# Patient Record
Sex: MALE | Race: OTHER | HISPANIC OR LATINO | URBAN - METROPOLITAN AREA
[De-identification: names, ages, dates, MRNs, and addresses within clinical notes are randomized per-mention and may not be internally consistent; named-entity substitution may affect disease eponyms.]

---

## 2019-02-06 ENCOUNTER — EMERGENCY (EMERGENCY)
Facility: HOSPITAL | Age: 29
LOS: 1 days | Discharge: HOME | End: 2019-02-06
Attending: EMERGENCY MEDICINE
Payer: SELF-PAY

## 2019-02-06 VITALS
HEIGHT: 71 IN | TEMPERATURE: 98 F | OXYGEN SATURATION: 96 % | SYSTOLIC BLOOD PRESSURE: 160 MMHG | DIASTOLIC BLOOD PRESSURE: 89 MMHG | HEART RATE: 85 BPM | WEIGHT: 240.08 LBS | RESPIRATION RATE: 16 BRPM

## 2019-02-06 LAB
ANION GAP SERPL CALC-SCNC: 13 MMOL/L — SIGNIFICANT CHANGE UP (ref 5–17)
APPEARANCE UR: CLEAR — SIGNIFICANT CHANGE UP
BACTERIA # UR AUTO: ABNORMAL
BILIRUB UR-MCNC: NEGATIVE — SIGNIFICANT CHANGE UP
BUN SERPL-MCNC: 13 MG/DL — SIGNIFICANT CHANGE UP (ref 8–20)
CALCIUM SERPL-MCNC: 9.2 MG/DL — SIGNIFICANT CHANGE UP (ref 8.6–10.2)
CHLORIDE SERPL-SCNC: 102 MMOL/L — SIGNIFICANT CHANGE UP (ref 98–107)
CO2 SERPL-SCNC: 24 MMOL/L — SIGNIFICANT CHANGE UP (ref 22–29)
COLOR SPEC: YELLOW — SIGNIFICANT CHANGE UP
CREAT SERPL-MCNC: 1.13 MG/DL — SIGNIFICANT CHANGE UP (ref 0.5–1.3)
DIFF PNL FLD: NEGATIVE — SIGNIFICANT CHANGE UP
EPI CELLS # UR: SIGNIFICANT CHANGE UP
GLUCOSE SERPL-MCNC: 90 MG/DL — SIGNIFICANT CHANGE UP (ref 70–115)
GLUCOSE UR QL: NEGATIVE MG/DL — SIGNIFICANT CHANGE UP
KETONES UR-MCNC: ABNORMAL
LEUKOCYTE ESTERASE UR-ACNC: ABNORMAL
NITRITE UR-MCNC: NEGATIVE — SIGNIFICANT CHANGE UP
PH UR: 5 — SIGNIFICANT CHANGE UP (ref 5–8)
POTASSIUM SERPL-MCNC: 4 MMOL/L — SIGNIFICANT CHANGE UP (ref 3.5–5.3)
POTASSIUM SERPL-SCNC: 4 MMOL/L — SIGNIFICANT CHANGE UP (ref 3.5–5.3)
PROT UR-MCNC: 15 MG/DL
RBC CASTS # UR COMP ASSIST: SIGNIFICANT CHANGE UP /HPF (ref 0–4)
SODIUM SERPL-SCNC: 139 MMOL/L — SIGNIFICANT CHANGE UP (ref 135–145)
SP GR SPEC: 1.02 — SIGNIFICANT CHANGE UP (ref 1.01–1.02)
UROBILINOGEN FLD QL: NEGATIVE MG/DL — SIGNIFICANT CHANGE UP
WBC UR QL: SIGNIFICANT CHANGE UP

## 2019-02-06 PROCEDURE — 96372 THER/PROPH/DIAG INJ SC/IM: CPT

## 2019-02-06 PROCEDURE — 87086 URINE CULTURE/COLONY COUNT: CPT

## 2019-02-06 PROCEDURE — 81001 URINALYSIS AUTO W/SCOPE: CPT

## 2019-02-06 PROCEDURE — 36415 COLL VENOUS BLD VENIPUNCTURE: CPT

## 2019-02-06 PROCEDURE — 99284 EMERGENCY DEPT VISIT MOD MDM: CPT | Mod: 25

## 2019-02-06 PROCEDURE — 74176 CT ABD & PELVIS W/O CONTRAST: CPT | Mod: 26

## 2019-02-06 PROCEDURE — 74176 CT ABD & PELVIS W/O CONTRAST: CPT

## 2019-02-06 PROCEDURE — 99284 EMERGENCY DEPT VISIT MOD MDM: CPT

## 2019-02-06 PROCEDURE — 80048 BASIC METABOLIC PNL TOTAL CA: CPT

## 2019-02-06 RX ORDER — ONDANSETRON 8 MG/1
8 TABLET, FILM COATED ORAL ONCE
Qty: 0 | Refills: 0 | Status: COMPLETED | OUTPATIENT
Start: 2019-02-06 | End: 2019-02-06

## 2019-02-06 RX ORDER — ONDANSETRON 8 MG/1
1 TABLET, FILM COATED ORAL
Qty: 21 | Refills: 0
Start: 2019-02-06 | End: 2019-02-12

## 2019-02-06 RX ORDER — IBUPROFEN 200 MG
1 TABLET ORAL
Qty: 15 | Refills: 0
Start: 2019-02-06 | End: 2019-02-10

## 2019-02-06 RX ORDER — KETOROLAC TROMETHAMINE 30 MG/ML
60 SYRINGE (ML) INJECTION ONCE
Qty: 0 | Refills: 0 | Status: DISCONTINUED | OUTPATIENT
Start: 2019-02-06 | End: 2019-02-06

## 2019-02-06 RX ADMIN — Medication 60 MILLIGRAM(S): at 16:55

## 2019-02-06 RX ADMIN — ONDANSETRON 8 MILLIGRAM(S): 8 TABLET, FILM COATED ORAL at 17:19

## 2019-02-06 NOTE — ED ADULT TRIAGE NOTE - CHIEF COMPLAINT QUOTE
Pt ambulatory in ED c/o left side flank pain with associated hematuria. Also reports episode of "vomiting blood."

## 2019-02-06 NOTE — ED STATDOCS - PROGRESS NOTE DETAILS
NP NOTE:  HPI, ROS, PE of intake doctor reviewed.  Pain resolved.  Labs and CT no evidence of kidney stone however most likely passed a small one PTA.  Will d/c home rx ibuprofen, zofran and referral [facility].

## 2019-02-06 NOTE — ED STATDOCS - OBJECTIVE STATEMENT
27 y/o M pt with PMHx kidney stone presents to the ED c/o urinary symptoms beginning 6:00am today.  Pt reports dysuria and hematuria beginning when he first went to the bathroom today.  He had 3 episodes of hematuria and dysuria today, with associated pelvic pain.  FHx colitis.  Denies fever, chills, CP, SOB, N/V.  No further acute complaints at this time.

## 2019-02-06 NOTE — ED STATDOCS - ATTENDING CONTRIBUTION TO CARE
I, Jeremias Walls, performed the initial face to face bedside interview with this patient regarding history of present illness, review of symptoms and relevant past medical, social and family history.  I completed an independent physical examination.  I was the initial provider who evaluated this patient. I have signed out the follow up of any pending tests (i.e. labs, radiological studies) to the ACP.  I have communicated the patient’s plan of care and disposition with the ACP.

## 2019-02-06 NOTE — ED STATDOCS - CARE PLAN
Principal Discharge DX:	Hematuria Principal Discharge DX:	Hematuria  Secondary Diagnosis:	Flank pain, acute

## 2019-02-07 LAB
CULTURE RESULTS: NO GROWTH — SIGNIFICANT CHANGE UP
SPECIMEN SOURCE: SIGNIFICANT CHANGE UP

## 2020-09-23 ENCOUNTER — EMERGENCY (EMERGENCY)
Facility: HOSPITAL | Age: 30
LOS: 1 days | Discharge: HOME | End: 2020-09-23
Attending: EMERGENCY MEDICINE
Payer: COMMERCIAL

## 2020-09-23 VITALS
HEART RATE: 98 BPM | SYSTOLIC BLOOD PRESSURE: 129 MMHG | OXYGEN SATURATION: 95 % | DIASTOLIC BLOOD PRESSURE: 80 MMHG | WEIGHT: 274.92 LBS | TEMPERATURE: 99 F | HEIGHT: 71 IN | RESPIRATION RATE: 18 BRPM

## 2020-09-23 PROCEDURE — 99285 EMERGENCY DEPT VISIT HI MDM: CPT

## 2020-09-24 VITALS
SYSTOLIC BLOOD PRESSURE: 150 MMHG | DIASTOLIC BLOOD PRESSURE: 80 MMHG | RESPIRATION RATE: 15 BRPM | TEMPERATURE: 99 F | OXYGEN SATURATION: 98 % | HEART RATE: 75 BPM

## 2020-09-24 PROBLEM — Z00.00 ENCOUNTER FOR PREVENTIVE HEALTH EXAMINATION: Status: ACTIVE | Noted: 2020-09-24

## 2020-09-24 LAB
ALBUMIN SERPL ELPH-MCNC: 4 G/DL — SIGNIFICANT CHANGE UP (ref 3.3–5.2)
ALP SERPL-CCNC: 80 U/L — SIGNIFICANT CHANGE UP (ref 40–120)
ALT FLD-CCNC: 38 U/L — SIGNIFICANT CHANGE UP
ANION GAP SERPL CALC-SCNC: 15 MMOL/L — SIGNIFICANT CHANGE UP (ref 5–17)
APTT BLD: 30.1 SEC — SIGNIFICANT CHANGE UP (ref 27.5–35.5)
AST SERPL-CCNC: 26 U/L — SIGNIFICANT CHANGE UP
BASOPHILS # BLD AUTO: 0.01 K/UL — SIGNIFICANT CHANGE UP (ref 0–0.2)
BASOPHILS NFR BLD AUTO: 0.2 % — SIGNIFICANT CHANGE UP (ref 0–2)
BILIRUB SERPL-MCNC: 0.6 MG/DL — SIGNIFICANT CHANGE UP (ref 0.4–2)
BLD GP AB SCN SERPL QL: SIGNIFICANT CHANGE UP
BUN SERPL-MCNC: 18 MG/DL — SIGNIFICANT CHANGE UP (ref 8–20)
CALCIUM SERPL-MCNC: 8.9 MG/DL — SIGNIFICANT CHANGE UP (ref 8.6–10.2)
CHLORIDE SERPL-SCNC: 100 MMOL/L — SIGNIFICANT CHANGE UP (ref 98–107)
CK MB CFR SERPL CALC: 3.9 NG/ML — SIGNIFICANT CHANGE UP (ref 0–6.7)
CK SERPL-CCNC: 553 U/L — HIGH (ref 30–200)
CO2 SERPL-SCNC: 23 MMOL/L — SIGNIFICANT CHANGE UP (ref 22–29)
CREAT SERPL-MCNC: 1.23 MG/DL — SIGNIFICANT CHANGE UP (ref 0.5–1.3)
EOSINOPHIL # BLD AUTO: 0.04 K/UL — SIGNIFICANT CHANGE UP (ref 0–0.5)
EOSINOPHIL NFR BLD AUTO: 0.7 % — SIGNIFICANT CHANGE UP (ref 0–6)
GLUCOSE SERPL-MCNC: 99 MG/DL — SIGNIFICANT CHANGE UP (ref 70–99)
HCT VFR BLD CALC: 43.4 % — SIGNIFICANT CHANGE UP (ref 39–50)
HGB BLD-MCNC: 15 G/DL — SIGNIFICANT CHANGE UP (ref 13–17)
IMM GRANULOCYTES NFR BLD AUTO: 0.7 % — SIGNIFICANT CHANGE UP (ref 0–1.5)
INR BLD: 1.11 RATIO — SIGNIFICANT CHANGE UP (ref 0.88–1.16)
LIDOCAIN IGE QN: 26 U/L — SIGNIFICANT CHANGE UP (ref 22–51)
LYMPHOCYTES # BLD AUTO: 1.5 K/UL — SIGNIFICANT CHANGE UP (ref 1–3.3)
LYMPHOCYTES # BLD AUTO: 24.5 % — SIGNIFICANT CHANGE UP (ref 13–44)
MCHC RBC-ENTMCNC: 29.6 PG — SIGNIFICANT CHANGE UP (ref 27–34)
MCHC RBC-ENTMCNC: 34.6 GM/DL — SIGNIFICANT CHANGE UP (ref 32–36)
MCV RBC AUTO: 85.8 FL — SIGNIFICANT CHANGE UP (ref 80–100)
MONOCYTES # BLD AUTO: 0.54 K/UL — SIGNIFICANT CHANGE UP (ref 0–0.9)
MONOCYTES NFR BLD AUTO: 8.8 % — SIGNIFICANT CHANGE UP (ref 2–14)
NEUTROPHILS # BLD AUTO: 3.98 K/UL — SIGNIFICANT CHANGE UP (ref 1.8–7.4)
NEUTROPHILS NFR BLD AUTO: 65.1 % — SIGNIFICANT CHANGE UP (ref 43–77)
PLATELET # BLD AUTO: 159 K/UL — SIGNIFICANT CHANGE UP (ref 150–400)
POTASSIUM SERPL-MCNC: 3.6 MMOL/L — SIGNIFICANT CHANGE UP (ref 3.5–5.3)
POTASSIUM SERPL-SCNC: 3.6 MMOL/L — SIGNIFICANT CHANGE UP (ref 3.5–5.3)
PROT SERPL-MCNC: 6.7 G/DL — SIGNIFICANT CHANGE UP (ref 6.6–8.7)
PROTHROM AB SERPL-ACNC: 12.8 SEC — SIGNIFICANT CHANGE UP (ref 10.6–13.6)
RBC # BLD: 5.06 M/UL — SIGNIFICANT CHANGE UP (ref 4.2–5.8)
RBC # FLD: 12.3 % — SIGNIFICANT CHANGE UP (ref 10.3–14.5)
SODIUM SERPL-SCNC: 138 MMOL/L — SIGNIFICANT CHANGE UP (ref 135–145)
WBC # BLD: 6.11 K/UL — SIGNIFICANT CHANGE UP (ref 3.8–10.5)
WBC # FLD AUTO: 6.11 K/UL — SIGNIFICANT CHANGE UP (ref 3.8–10.5)

## 2020-09-24 PROCEDURE — 85610 PROTHROMBIN TIME: CPT

## 2020-09-24 PROCEDURE — 74177 CT ABD & PELVIS W/CONTRAST: CPT

## 2020-09-24 PROCEDURE — 80053 COMPREHEN METABOLIC PANEL: CPT

## 2020-09-24 PROCEDURE — 86901 BLOOD TYPING SEROLOGIC RH(D): CPT

## 2020-09-24 PROCEDURE — 96374 THER/PROPH/DIAG INJ IV PUSH: CPT | Mod: XU

## 2020-09-24 PROCEDURE — 74177 CT ABD & PELVIS W/CONTRAST: CPT | Mod: 26

## 2020-09-24 PROCEDURE — 82550 ASSAY OF CK (CPK): CPT

## 2020-09-24 PROCEDURE — 85730 THROMBOPLASTIN TIME PARTIAL: CPT

## 2020-09-24 PROCEDURE — 86900 BLOOD TYPING SEROLOGIC ABO: CPT

## 2020-09-24 PROCEDURE — 86850 RBC ANTIBODY SCREEN: CPT

## 2020-09-24 PROCEDURE — 36415 COLL VENOUS BLD VENIPUNCTURE: CPT

## 2020-09-24 PROCEDURE — 85025 COMPLETE CBC W/AUTO DIFF WBC: CPT

## 2020-09-24 PROCEDURE — 82553 CREATINE MB FRACTION: CPT

## 2020-09-24 PROCEDURE — 83690 ASSAY OF LIPASE: CPT

## 2020-09-24 PROCEDURE — 99284 EMERGENCY DEPT VISIT MOD MDM: CPT | Mod: 25

## 2020-09-24 RX ORDER — KETOROLAC TROMETHAMINE 30 MG/ML
15 SYRINGE (ML) INJECTION ONCE
Refills: 0 | Status: DISCONTINUED | OUTPATIENT
Start: 2020-09-24 | End: 2020-09-24

## 2020-09-24 RX ORDER — IBUPROFEN 200 MG
1 TABLET ORAL
Qty: 20 | Refills: 0
Start: 2020-09-24 | End: 2020-09-28

## 2020-09-24 RX ORDER — ONDANSETRON 8 MG/1
1 TABLET, FILM COATED ORAL
Qty: 16 | Refills: 0
Start: 2020-09-24 | End: 2020-09-27

## 2020-09-24 RX ORDER — SODIUM CHLORIDE 9 MG/ML
1000 INJECTION, SOLUTION INTRAVENOUS ONCE
Refills: 0 | Status: COMPLETED | OUTPATIENT
Start: 2020-09-24 | End: 2020-09-24

## 2020-09-24 RX ADMIN — Medication 15 MILLIGRAM(S): at 00:34

## 2020-09-24 RX ADMIN — SODIUM CHLORIDE 1000 MILLILITER(S): 9 INJECTION, SOLUTION INTRAVENOUS at 00:35

## 2020-09-24 NOTE — ED PROVIDER NOTE - CARE PROVIDER_API CALL
Sammy Fregoso  GASTROENTEROLOGY  [address] [place] [address]  Phone: [phone]  Fax: [phone]  Follow Up Time:

## 2020-09-24 NOTE — ED PROVIDER NOTE - CLINICAL SUMMARY MEDICAL DECISION MAKING FREE TEXT BOX
PT with stable VS, no acute distress, non toxic appearing, tolerating PO in the ED, Pt with findings like of enteritis will tx supportively hold ABx due to duration of symptoms Pt dc home with follow up to [facility], GI, educated about when to return to the ED if needed. PT verbalizes that he understands all instructions and results. Pt informed that ED is open and available 24/7 365 days a yr, encouraged to return to the ED if they have any change in condition, or feel the need for revaluation.

## 2020-09-24 NOTE — ED PROVIDER NOTE - ATTENDING CONTRIBUTION TO CARE
1 day of lower abdominal pain, mild lower tenderness but no peritoneal signs, reassuring labs and vitals. CT shows enteritis, no acute surgical pathology. Supportive care at this time, return if worsening, hold antibiotics for now.

## 2020-09-24 NOTE — ED PROVIDER NOTE - NS ED ROS FT
ROS: CONTUSIONAL: Denies fever, chills, fatigue, wt loss. HEAD: Denies trauma, HA, Dizziness. EYE: Denies Acute visual changes, diplopia. ENMT: Denies change in hearing, tinnitus, epistaxis, difficulty swallowing, sore throat. CARDIO: Denies CP, palpitations, edema. RESP: Denies Cough, SOB , Diff breathing, hemoptysis. GI: Denies N/V, ABD pain. +change in bowel movement. URINARY: Denies difficulty urinating, pelvic pain. MS:  Denies joint pain, back pain, weakness, decreased ROM, swelling. NEURO: Denies change in gait, seizures, loss of sensation, dizziness, confusion LOC.  PSY: NO SI/HI.

## 2020-09-24 NOTE — ED PROVIDER NOTE - NSFOLLOWUPINSTRUCTIONS_ED_ALL_ED_FT
Patient education: Severe abdominal pain (The Basics)  View in [place]  Written by the doctors and editors at [facility]  Are there different types of abdominal pain?  Yes. "Abdominal pain" means pain in the abdomen (or belly), which is the part of the body between the chest and the genital area. This pain can happen for different reasons. It can be "chronic," which means it develops over time, or "acute," which means it starts suddenly. It can be mild or severe. A person might feel the pain all over their abdomen, or only in 1 part.    Abdominal pain can feel different for different people. It can feel sharp or crampy, or dull and steady. Some people feel better if they curl into a ball, while others need to lie flat and completely still. People often feel sick to their stomach and retch or vomit.    Doctors use the term "acute abdomen" to describe an episode of severe abdominal pain that starts suddenly and lasts for a few hours or longer. It can cause pain so severe that the person has a hard time moving or breathing and it makes them want to go to the hospital or see their doctor or nurse right away. A true acute abdomen is a medical emergency.    What causes abdominal pain?  Lots of different things can cause abdominal pain. When pain is less severe, it can be due to something like a virus or a stomach inflammation (called "gastritis").    Acute pain that is more severe can be caused by problems with 1 or more organs in the abdomen. Organs in the abdomen can be part of the digestive, urinary, or reproductive systems (figure 1 and figure 2 and figure 3).    Conditions that affect organs in the chest or genital area can also cause pain. Even though these organs aren't in the abdomen, people might still have abdominal pain.    Common causes of acute abdominal pain in adults include:    ?Appendicitis – Appendicitis is the term for when the appendix (a long, thin pouch that hangs down from the large intestine) gets infected and inflamed.    ?Diverticulitis – Diverticulitis is an infection that develops in small pouches that can form in the intestine. This is common in older people.    ?Gallstones – Gallstones are small stones that form inside an organ called the gallbladder, which stores bile, a fluid that helps the body break down fat.    ?Abscess – An abscess is a collection of pus. An abscess can form in the abdomen, typically near the intestine.    ?Kidney stones – Kidney stones can form when salts and minerals that are normally in the urine build up and harden. They can cause pain when they pass through the ureters, which are the tubes that carry urine from the kidney to the bladder.    ?Bowel perforation – This is a hole in the bowel wall.    ?Perforated ulcer – This is a hole in the wall of the stomach or intestine.    ?Pancreatitis – This is the term for when the pancreas gets inflamed.    ?Ruptured cyst in the ovary – Cysts in the ovary are fluid-filled sacs that can form in some women. They sometimes rupture, which means that they break open and spill out.    ?Ectopic pregnancy – An ectopic pregnancy is a pregnancy that develops outside the uterus.    Should I see a doctor or nurse?  Yes. If you have sudden or severe abdominal pain, call your doctor or nurse or go to the hospital right away. Depending on the cause of your pain, you might need immediate treatment.    Will I need tests?  Probably. The doctor or nurse will ask about your symptoms, including where your pain is and what it feels like. The location of the pain can be an important clue to the cause.    Your doctor will ask about your current and past medical conditions, and do a physical exam. They might do repeat exams over time to follow your symptoms.    Your doctor will decide which tests you should have based on your symptoms and individual situation. The tests might include:    ?Blood tests    ?Urine tests    ?X-rays    ?An ultrasound, CT scan, or other imaging test – Imaging tests create pictures of the inside of the body.    How is abdominal pain treated?  Treatment depends on what's causing the pain. It might include 1 or more of the following:    ?Fluids given by IV    ?Pain medicines    ?Antibiotic medicines to treat an infection    ?Other medicines to treat other medical conditions    ?Surgery

## 2020-09-24 NOTE — ED ADULT NURSE REASSESSMENT NOTE - NS ED NURSE REASSESS COMMENT FT1
received pt from previous Rn at 3:00 pt aao x4. respirations even and unlabored. skin color wnl warm and dry. states relief of pain. no complaints. pending results. will continue to monitor.

## 2020-09-24 NOTE — ED PROVIDER NOTE - PATIENT PORTAL LINK FT
You can access the FollowMyHealth Patient Portal offered by [facility] by registering at the following website: [email or web address]. By joining [employer]’s FollowMyHealth portal, you will also be able to view your health information using other applications (apps) compatible with our system.

## 2020-09-24 NOTE — ED PROVIDER NOTE - OBJECTIVE STATEMENT
PT with no SPMHx presents to the ED with complaint of lower abd pain x 1 day. Pt states that he had a gradgual onset of lower abd pain that has been constat since onset described as moderate soreness, Pt states that pain is made worse with having BM. Pt states that he has also had bloody loss stools today. PT states that they have not done anything for the pain prior to coming to the ED. Pt dines fever, chills, weakness, numbness, tingling, HA dizziness, SOB, digg breathing, CP, cough, rash.

## 2020-09-26 ENCOUNTER — EMERGENCY (EMERGENCY)
Facility: HOSPITAL | Age: 30
LOS: 1 days | Discharge: HOME | End: 2020-09-26
Attending: EMERGENCY MEDICINE
Payer: COMMERCIAL

## 2020-09-26 VITALS
TEMPERATURE: 98 F | OXYGEN SATURATION: 96 % | HEIGHT: 71 IN | SYSTOLIC BLOOD PRESSURE: 118 MMHG | RESPIRATION RATE: 18 BRPM | WEIGHT: 270.07 LBS | HEART RATE: 97 BPM | DIASTOLIC BLOOD PRESSURE: 63 MMHG

## 2020-09-26 PROCEDURE — 73630 X-RAY EXAM OF FOOT: CPT | Mod: 26,RT

## 2020-09-26 PROCEDURE — 73610 X-RAY EXAM OF ANKLE: CPT

## 2020-09-26 PROCEDURE — 73630 X-RAY EXAM OF FOOT: CPT

## 2020-09-26 PROCEDURE — 73610 X-RAY EXAM OF ANKLE: CPT | Mod: 26,RT

## 2020-09-26 PROCEDURE — 99283 EMERGENCY DEPT VISIT LOW MDM: CPT

## 2020-09-26 PROCEDURE — 99284 EMERGENCY DEPT VISIT MOD MDM: CPT

## 2020-09-26 RX ORDER — IBUPROFEN 200 MG
600 TABLET ORAL ONCE
Refills: 0 | Status: COMPLETED | OUTPATIENT
Start: 2020-09-26 | End: 2020-09-26

## 2020-09-26 RX ADMIN — Medication 600 MILLIGRAM(S): at 19:32

## 2020-09-26 NOTE — ED PROVIDER NOTE - OBJECTIVE STATEMENT
29 y/o M c/o pain in right heel x 1 day.  Patient states that pain started after he fell out of his car and hit his right achilles area on the ground.  Patient denies head trauma, neck pain, LOC, visual changes, numbness or any other injuries.  Patient is able to bear weight.

## 2020-09-26 NOTE — ED ADULT TRIAGE NOTE - CHIEF COMPLAINT QUOTE
Pt A&Ox4 states "I fell out of the car and hit my heel sounded like ti cracked and now it numb." Patient has pain and numbness to right hill.

## 2020-09-26 NOTE — ED PROVIDER NOTE - PATIENT PORTAL LINK FT
You can access the FollowMyHealth Patient Portal offered by [facility] by registering at the following website: [email or web address]. By joining [employer]’s FollowMyHealth portal, you will also be able to view your health information using other applications (apps) compatible with our system.

## 2020-09-26 NOTE — ED PROVIDER NOTE - MUSCULOSKELETAL, MLM
Spine appears normal, range of motion is not limited, no muscle or joint tenderness.  Negative Fraire test

## 2020-09-26 NOTE — ED PROVIDER NOTE - CARE PROVIDER_API CALL
Dana, Peter J  PODIATRIC MEDICINE AND SURGERY  [address] [place] [address]  Phone: [phone]  Fax: [phone]  Follow Up Time:

## 2020-09-26 NOTE — ED PROVIDER NOTE - ATTENDING CONTRIBUTION TO CARE
31yo M pw right foot pain sp twisting his foot when getting out of a car. able to walk with a limp. hurts at the heel. no head trauma no loc. Denies f/c/n/v/cp/sob/palpitations/ cough/rash/headache/dizziness/abd.pain/d/c/dysuria/hematuria      le: neurovascularly intact 2+ dorsalis pedis no swelling  or ttp to lateral or medial malleolus; midl ttp to heel of foot, neg thompsons test    -->most likely sprain/contusion--will fu xray pain meds--anticipate dc with podiatry follow up; hard sole shoe for comfort

## 2021-03-16 ENCOUNTER — EMERGENCY (EMERGENCY)
Facility: HOSPITAL | Age: 31
LOS: 1 days | Discharge: HOME | End: 2021-03-16
Attending: EMERGENCY MEDICINE
Payer: COMMERCIAL

## 2021-03-16 VITALS
SYSTOLIC BLOOD PRESSURE: 123 MMHG | HEART RATE: 78 BPM | TEMPERATURE: 98 F | RESPIRATION RATE: 17 BRPM | DIASTOLIC BLOOD PRESSURE: 80 MMHG | OXYGEN SATURATION: 97 %

## 2021-03-16 VITALS — HEIGHT: 71 IN

## 2021-03-16 LAB
ALBUMIN SERPL ELPH-MCNC: 4.7 G/DL — SIGNIFICANT CHANGE UP (ref 3.3–5.2)
ALP SERPL-CCNC: 86 U/L — SIGNIFICANT CHANGE UP (ref 40–120)
ALT FLD-CCNC: 34 U/L — SIGNIFICANT CHANGE UP
ANION GAP SERPL CALC-SCNC: 12 MMOL/L — SIGNIFICANT CHANGE UP (ref 5–17)
AST SERPL-CCNC: 22 U/L — SIGNIFICANT CHANGE UP
BASOPHILS # BLD AUTO: 0.02 K/UL — SIGNIFICANT CHANGE UP (ref 0–0.2)
BASOPHILS NFR BLD AUTO: 0.3 % — SIGNIFICANT CHANGE UP (ref 0–2)
BILIRUB SERPL-MCNC: 0.4 MG/DL — SIGNIFICANT CHANGE UP (ref 0.4–2)
BUN SERPL-MCNC: 17 MG/DL — SIGNIFICANT CHANGE UP (ref 8–20)
CALCIUM SERPL-MCNC: 9.3 MG/DL — SIGNIFICANT CHANGE UP (ref 8.6–10.2)
CHLORIDE SERPL-SCNC: 101 MMOL/L — SIGNIFICANT CHANGE UP (ref 98–107)
CO2 SERPL-SCNC: 28 MMOL/L — SIGNIFICANT CHANGE UP (ref 22–29)
CREAT SERPL-MCNC: 1.24 MG/DL — SIGNIFICANT CHANGE UP (ref 0.5–1.3)
EOSINOPHIL # BLD AUTO: 0.1 K/UL — SIGNIFICANT CHANGE UP (ref 0–0.5)
EOSINOPHIL NFR BLD AUTO: 1.5 % — SIGNIFICANT CHANGE UP (ref 0–6)
GLUCOSE SERPL-MCNC: 87 MG/DL — SIGNIFICANT CHANGE UP (ref 70–99)
HCT VFR BLD CALC: 49.1 % — SIGNIFICANT CHANGE UP (ref 39–50)
HGB BLD-MCNC: 16.7 G/DL — SIGNIFICANT CHANGE UP (ref 13–17)
IMM GRANULOCYTES NFR BLD AUTO: 0.5 % — SIGNIFICANT CHANGE UP (ref 0–1.5)
LYMPHOCYTES # BLD AUTO: 1.74 K/UL — SIGNIFICANT CHANGE UP (ref 1–3.3)
LYMPHOCYTES # BLD AUTO: 26.4 % — SIGNIFICANT CHANGE UP (ref 13–44)
MCHC RBC-ENTMCNC: 29.6 PG — SIGNIFICANT CHANGE UP (ref 27–34)
MCHC RBC-ENTMCNC: 34 GM/DL — SIGNIFICANT CHANGE UP (ref 32–36)
MCV RBC AUTO: 87.1 FL — SIGNIFICANT CHANGE UP (ref 80–100)
MONOCYTES # BLD AUTO: 0.43 K/UL — SIGNIFICANT CHANGE UP (ref 0–0.9)
MONOCYTES NFR BLD AUTO: 6.5 % — SIGNIFICANT CHANGE UP (ref 2–14)
NEUTROPHILS # BLD AUTO: 4.26 K/UL — SIGNIFICANT CHANGE UP (ref 1.8–7.4)
NEUTROPHILS NFR BLD AUTO: 64.8 % — SIGNIFICANT CHANGE UP (ref 43–77)
PLATELET # BLD AUTO: 181 K/UL — SIGNIFICANT CHANGE UP (ref 150–400)
POTASSIUM SERPL-MCNC: 4.6 MMOL/L — SIGNIFICANT CHANGE UP (ref 3.5–5.3)
POTASSIUM SERPL-SCNC: 4.6 MMOL/L — SIGNIFICANT CHANGE UP (ref 3.5–5.3)
PROT SERPL-MCNC: 7.5 G/DL — SIGNIFICANT CHANGE UP (ref 6.6–8.7)
RBC # BLD: 5.64 M/UL — SIGNIFICANT CHANGE UP (ref 4.2–5.8)
RBC # FLD: 12.2 % — SIGNIFICANT CHANGE UP (ref 10.3–14.5)
SODIUM SERPL-SCNC: 141 MMOL/L — SIGNIFICANT CHANGE UP (ref 135–145)
TROPONIN T SERPL-MCNC: <0.01 NG/ML — SIGNIFICANT CHANGE UP (ref 0–0.06)
WBC # BLD: 6.58 K/UL — SIGNIFICANT CHANGE UP (ref 3.8–10.5)
WBC # FLD AUTO: 6.58 K/UL — SIGNIFICANT CHANGE UP (ref 3.8–10.5)

## 2021-03-16 PROCEDURE — 99284 EMERGENCY DEPT VISIT MOD MDM: CPT

## 2021-03-16 PROCEDURE — 80053 COMPREHEN METABOLIC PANEL: CPT

## 2021-03-16 PROCEDURE — 85025 COMPLETE CBC W/AUTO DIFF WBC: CPT

## 2021-03-16 PROCEDURE — 36415 COLL VENOUS BLD VENIPUNCTURE: CPT

## 2021-03-16 PROCEDURE — 84484 ASSAY OF TROPONIN QUANT: CPT

## 2021-03-16 PROCEDURE — 71046 X-RAY EXAM CHEST 2 VIEWS: CPT | Mod: 26

## 2021-03-16 PROCEDURE — 71046 X-RAY EXAM CHEST 2 VIEWS: CPT

## 2021-03-16 PROCEDURE — 99284 EMERGENCY DEPT VISIT MOD MDM: CPT | Mod: 25

## 2021-03-16 RX ORDER — ASPIRIN/CALCIUM CARB/MAGNESIUM 324 MG
324 TABLET ORAL ONCE
Refills: 0 | Status: COMPLETED | OUTPATIENT
Start: 2021-03-16 | End: 2021-03-16

## 2021-03-16 RX ADMIN — Medication 324 MILLIGRAM(S): at 19:09

## 2021-03-16 NOTE — ED STATDOCS - CARE PROVIDER_API CALL
Alvaro Bradley)  Cardiovascular Disease  [address] [facility], [address] [place] [address]  Phone: [phone]  Fax: [phone]  Follow Up Time: 4-6 Days

## 2021-03-16 NOTE — ED STATDOCS - CLINICAL SUMMARY MEDICAL DECISION MAKING FREE TEXT BOX
Pt with left sided cp since 3PM, low risk for coronary disease, will give aspirin, check troponin and give CXR.

## 2021-03-16 NOTE — ED STATDOCS - NS ED ROS FT
Review of Systems  •	CONSTITUTIONAL - no  fever, no diaphoresis, no weight change  •	SKIN - no rash  •	HEMATOLOGIC - no bleeding, no bruising  •	EYES - no eye pain, no blurred vision  •	ENT - no change in hearing, no pain  •	RESPIRATORY - no shortness of breath, no cough  •	CARDIAC - (+) chest pain, no palpitations  •	GI - no abd pain, no nausea, no vomiting, no diarrhea, no constipation, no bleeding  •	GENITO-URINARY - no discharge, no dysuria; no hematuria,   •	ENDO - no polydipsia, no polyuria, no heat/no cold intolerance  •	MUSCULOSKELETAL - no joint pain, no swelling, no redness  •	NEUROLOGIC - no weakness, no headache, no anesthesia, no paresthesias  •	PSYCH - no anxiety, non suicidal, non homicidal, no hallucination, no depression

## 2021-03-16 NOTE — ED ADULT NURSE NOTE - OBJECTIVE STATEMENT
Pt c/o left sided chest pain since this afternoon.  Pt was at rest at onset.  Denies SOB. Area tender to palp.  Denies injury, significant PMH.  Pt NS on cardiac monitor.  No acute distress noted.

## 2021-03-16 NOTE — ED STATDOCS - NSFOLLOWUPINSTRUCTIONS_ED_ALL_ED_FT
- Please follow up with your Primary Care Doctor in 1 - 2 days. If you cannot follow-up with your primary care doctor please return to the ED for any urgent issues.  - Seek immediate medical care for any new, worsening or concerning signs or symptoms.   - You were given copies of all your test results, please bring to your primary care doctor for review of any abnormal test results  - Follow up with Cardiologist listed above    Feel better!      Chest Pain    WHAT YOU NEED TO KNOW:    Chest pain can be caused by a range of conditions, from not serious to life-threatening. Chest pain can be a symptom of a digestive problem, such as acid reflux or a stomach ulcer. An anxiety attack or a strong emotion, such as anger, can also cause chest pain. Infection, inflammation, or a fracture in the bones or cartilage in your chest can cause pain or discomfort. Sometimes chest pain or pressure is caused by poor blood flow to your heart (angina). Chest pain may also be caused by life-threatening conditions such as a heart attack or blood clot in your lungs.    DISCHARGE INSTRUCTIONS:    Call your local emergency number (911 in the ) or have someone call if:   •You have any of the following signs of a heart attack: ?Squeezing, pressure, or pain in your chest      ?You may also have any of the following: ?Discomfort or pain in your back, neck, jaw, stomach, or arm      ?Shortness of breath      ?Nausea or vomiting      ?Lightheadedness or a sudden cold sweat            Return to the emergency department if:   •You have chest discomfort that gets worse, even with medicine.      •You cough or vomit blood.      •Your bowel movements are black or bloody.      •You cannot stop vomiting, or it hurts to swallow.      Call your doctor if:   •You have questions or concerns about your condition or care.          Medicines:   •Medicines may be given to treat the cause of your chest pain. Examples include pain medicine, anxiety medicine, or medicines to increase blood flow to your heart.      •Do not take certain medicines without asking your healthcare provider first. These include NSAIDs, herbal or vitamin supplements, or hormones (estrogen or progestin).      •Take your medicine as directed. Contact your healthcare provider if you think your medicine is not helping or if you have side effects. Tell him or her if you are allergic to any medicine. Keep a list of the medicines, vitamins, and herbs you take. Include the amounts, and when and why you take them. Bring the list or the pill bottles to follow-up visits. Carry your medicine list with you in case of an emergency.      Healthy living tips: The following are general healthy guidelines. If the cause of your chest pain is known, your healthcare provider will give you specific guidelines to follow.  •Do not smoke. Nicotine and other chemicals in cigarettes and cigars can cause lung and heart damage. Ask your healthcare provider for information if you currently smoke and need help to quit. E-cigarettes or smokeless tobacco still contain nicotine. Talk to your healthcare provider before you use these products.      •Choose a variety of healthy foods as often as possible. Include fresh, frozen, or canned fruits and vegetables. Also include low-fat dairy products, fish, chicken (without skin), and lean meats. Your healthcare provider or a dietitian can help you create meal plans. You may need to avoid certain foods or drinks if your pain is caused by a digestion problem.  Healthy Foods           •Lower your sodium (salt) intake. Limit foods that are high in sodium, such as canned foods, salty snacks, and cold cuts. If you add salt when you cook food, do not add more at the table. Choose low-sodium canned foods as much as possible.             •Drink plenty of water every day. Water helps your body to control your temperature and blood pressure. Ask your healthcare provider how much water you should drink every day.      •Ask about activity. Your healthcare provider will tell you which activities to limit or avoid. Ask when you can drive, return to work, and have sex. Ask about the best exercise plan for you.      •Maintain a healthy weight. Ask your healthcare provider what a healthy weight is for you. Ask him or her to help you create a safe weight loss plan if you are overweight.      •Ask about vaccines you may need. Get the influenza (flu) vaccine every year as soon as recommended, usually in September or October. You may also need a pneumococcal vaccine to prevent pneumonia. The vaccine is usually given every 5 years, starting at age 65. Your healthcare provider can tell you if should get other vaccines, and when to get them.      Follow up with your healthcare provider within 72 hours, or as directed: You may need to return for more tests to find the cause of your chest pain. You may be referred to a specialist, such as a cardiologist or gastroenterologist. Write down your questions so you remember to ask them during your visits.

## 2021-03-16 NOTE — ED STATDOCS - OBJECTIVE STATEMENT
31 y/o M with no PMHx presents to ED c/o sharp and dull cp that doesn't radiate onset today that occurred when he was sitting down. FHx: grandfather has history of heart disease. Denies taking medications. Denies worsening pain with movement or deep breaths. Denies abdominal pain. Denies smoking.

## 2021-03-16 NOTE — ED STATDOCS - PATIENT PORTAL LINK FT
You can access the FollowMyHealth Patient Portal offered by [facility] by registering at the following website: [email or web address]. By joining [employer]’s FollowMyHealth portal, you will also be able to view your health information using other applications (apps) compatible with our system.

## 2021-03-16 NOTE — ED STATDOCS - NSPTACCESSSVCSAPPTDETAILS_ED_ALL_ED_FT
Discussed with the patient the benefits of quitting smoking. Patient agreed and stated he needed to quit as his peripheral bypass was already starting to close up. He wanted ot make sure he was ok. After discussion, he has is not planning to have surgery soon.  He feels stable enough to re-enter program. 1 week

## 2021-03-16 NOTE — ED STATDOCS - PROGRESS NOTE DETAILS
STU Max NOTE: Pt evaluated at bedside. Pt is 30 M with no PMHx presenting with dull achy chest pain.   PE: non toxic, RRR, lungs CTA, reproducible chest wall tenderness on left side. No LE edema.   Pt evaluated prior by intake physician. Otherwise HPI/PE/ROS as noted above. Will follow up plan per intake physician. STU Max NOTE: Reviewed all results and plan with Dr. Kumari. Pt stable for d/c, reports improvement, VSS, tolerating PO, ambulatory.  Discussion includes results, plan, and return precautions. Pt advised to f/u with PMD 1-2 days and specialists discussed.  Printed copies of available lab/radiology results contained within discharge packet. Pt verbalized understanding/agreement of plan.

## 2021-03-16 NOTE — ED STATDOCS - ATTENDING CONTRIBUTION TO CARE
I, José Kumari, performed the initial face to face bedside interview with this patient regarding history of present illness, review of symptoms and relevant past medical, social and family history.  I completed an independent physical examination.  I was the initial provider who evaluated this patient. I have signed out the follow up of any pending tests (i.e. labs, radiological studies) to the ACP.  I have communicated the patient’s plan of care and disposition with the ACP.

## 2021-05-17 NOTE — ED STATDOCS - NS ED ATTENDING STATEMENT MOD
Requested Prescriptions     Pending Prescriptions Disp Refills    Blood-Glucose Meter (Accu-Chek Laney Plus Meter) misc        Sig: by Does Not Apply route.     lancets (Accu-Chek Softclix Lancets) misc 1 Each 11    glucose blood VI test strips (Accu-Chek Laney Plus test strp) strip          [facility] - [place] [address] I have personally performed a face to face diagnostic evaluation on this patient. I have reviewed the ACP note and agree with the history, exam and plan of care, except as noted.

## 2021-08-13 NOTE — ED PROVIDER NOTE - ADDITIONAL NOTES AND INSTRUCTIONS:
Needs careful observation for falls.   PT was evaluated At [facility] ED and was found to have a condition that warranted time of to rest and heal from WORK/SCHOOL.   Roger Gaona PA-C

## 2022-04-16 ENCOUNTER — EMERGENCY (EMERGENCY)
Facility: HOSPITAL | Age: 32
LOS: 1 days | Discharge: HOME | End: 2022-04-16
Attending: EMERGENCY MEDICINE
Payer: SUBSIDIZED

## 2022-04-16 VITALS
WEIGHT: 240.08 LBS | RESPIRATION RATE: 18 BRPM | SYSTOLIC BLOOD PRESSURE: 141 MMHG | HEIGHT: 71 IN | OXYGEN SATURATION: 100 % | TEMPERATURE: 98 F | DIASTOLIC BLOOD PRESSURE: 90 MMHG | HEART RATE: 80 BPM

## 2022-04-16 PROCEDURE — 99053 MED SERV 10PM-8AM 24 HR FAC: CPT

## 2022-04-16 PROCEDURE — 99284 EMERGENCY DEPT VISIT MOD MDM: CPT

## 2022-04-16 PROCEDURE — 99284 EMERGENCY DEPT VISIT MOD MDM: CPT | Mod: 25

## 2022-04-16 PROCEDURE — 73502 X-RAY EXAM HIP UNI 2-3 VIEWS: CPT

## 2022-04-16 PROCEDURE — 73562 X-RAY EXAM OF KNEE 3: CPT

## 2022-04-16 PROCEDURE — 72170 X-RAY EXAM OF PELVIS: CPT

## 2022-04-16 PROCEDURE — 73552 X-RAY EXAM OF FEMUR 2/>: CPT

## 2022-04-16 RX ORDER — ACETAMINOPHEN 500 MG
975 TABLET ORAL ONCE
Refills: 0 | Status: COMPLETED | OUTPATIENT
Start: 2022-04-16 | End: 2022-04-16

## 2022-04-16 RX ORDER — IBUPROFEN 200 MG
600 TABLET ORAL ONCE
Refills: 0 | Status: COMPLETED | OUTPATIENT
Start: 2022-04-16 | End: 2022-04-16

## 2022-04-16 RX ADMIN — Medication 975 MILLIGRAM(S): at 23:49

## 2022-04-16 RX ADMIN — Medication 600 MILLIGRAM(S): at 23:49

## 2022-04-16 NOTE — ED PROVIDER NOTE - OBJECTIVE STATEMENT
30 yo male no major PMHx presents after injuring his right knee/hip while playing soccer earlier today. Patient states that he went to make a catch, bent in the wrong direction and hurt his knee. He is experiencing pain in his thigh and pain into his hip. He did not take any pain medication pta at the ed. He denies falling and hitting his head. Denies other trauma. Denies other recent illness. 32 yo male no major PMHx presents after injuring his right knee/hip while playing soccer earlier today. Patient states that he went to make a catch, bent in the wrong direction and hurt his knee. He is experiencing pain in his thigh and pain into his hip. He did not take any pain medication pta at the ed. Patient is unable to bear weight on the injured extremity. He denies falling and hitting his head. Denies other trauma. Denies other recent illness.

## 2022-04-16 NOTE — ED PROVIDER NOTE - PHYSICAL EXAMINATION
Gen: Well appearing in NAD  Head: NC/AT  Neck: trachea midline  Resp:  No distress  Ext: Right knee ttp, unable to range knee secondary to pain. Right femur ttp, right hip ttp.   Neuro:  A&O appears non focal  Skin:  Warm and dry as visualized  Psych:  Normal affect and mood

## 2022-04-16 NOTE — ED PROVIDER NOTE - PROGRESS NOTE DETAILS
Rashaad: No acute fracture on imaging but moderated knee effusion noted on right knee. Ortho recommending knee immobilizer. Will give knee immobilizer, give ortho followup for later this week. Plan to dc the patient.

## 2022-04-16 NOTE — ED PROVIDER NOTE - CLINICAL SUMMARY MEDICAL DECISION MAKING FREE TEXT BOX
30 yo male presents w right knee, thigh, hip pain s/p injury while playing softball. Will image right knee, hip, femur. PO meds for pain control. Monitor and reassess.

## 2022-04-16 NOTE — ED PROVIDER NOTE - ADDITIONAL NOTES AND INSTRUCTIONS:
Please excuse Jakub Hunta from work through 4/22/22. He was seen in the ER at [facility] on 4/17/22 and treated for a medical condition.

## 2022-04-16 NOTE — ED PROVIDER NOTE - CARE PROVIDER_API CALL
Aide Contreras)  Orthopedics  [address] [place] [address], [address]  [place] [address]  Phone: [phone]  Fax: [phone]  Follow Up Time: 1-3 Days

## 2022-04-16 NOTE — ED PROVIDER NOTE - ATTENDING CONTRIBUTION TO CARE
Rotational injury to the right leg with knee pain and swelling, unable to bear weight or straight leg raise. No fracture on XR, moderate joint effusion. Concern for possible patellar tendon injury vs ligamentous injury. Knee immobilizer, NWB, orthopedics follow up outpatient.

## 2022-04-16 NOTE — ED ADULT TRIAGE NOTE - CHIEF COMPLAINT QUOTE
patient was playing softball someone collided into patient complaining of right leg pain difficulty move , pain from Hip to Knee

## 2022-04-16 NOTE — ED PROVIDER NOTE - PATIENT PORTAL LINK FT
You can access the FollowMyHealth Patient Portal offered by [facility] by registering at the following website: [email or web address]. By joining [employer]’s FollowMyHealth portal, you will also be able to view your health information using other applications (apps) compatible with our system.

## 2022-04-16 NOTE — ED PROVIDER NOTE - NSFOLLOWUPINSTRUCTIONS_ED_ALL_ED_FT
Patellar Tendon Tear       A patellar tendon tear, also called a patellar tendon rupture, is a tear in the thick band of tissue that connects the kneecap (patella) to the shin bone (tibia). The condition can make it hard or impossible to straighten your leg.      What are the causes?    This condition may be caused by:  •A hard, direct hit to the front of your knee.      •Falling on your knee.      •A deep cut under your patella.      •Landing on your foot with your knee bent after a high jump or fall.      •Activity-related wear and tear that weakens the tendon.        What increases the risk?    You are more likely to develop this condition if you:  •Are an athlete who plays contact sports.      •Are an athlete who participates in sports that involve jumping or have a high risk of falls, such as downhill skiing.      •Are younger than age 40.    •Have a weakened tendon from:  •Long-lasting jumper's knee (patellar tendinitis).      •Medical conditions like diabetes or rheumatoid arthritis.      •Repeated corticosteroid injections to the knee.          What are the signs or symptoms?    The main symptom of this condition is severe pain. Other symptoms include:  •A popping sound.      •Swelling.      •Weakness in your knee.      •Pain and tenderness in your knee.      •Difficulty straightening your knee or not being able to straighten your knee.      •A feeling that your knee is giving way (instability).      •Bruising.      •A patella that slides up toward the thigh.        How is this diagnosed?    This condition may be diagnosed based on:  •Your symptoms.      •Your medical history.      •A physical exam. During the exam, your health care provider will check the position of your patella and see if you can extend your knee.    •Imaging tests, such as:  •X-rays to check if your patella has moved up and out of place.      •An MRI to check for a tear or disruption of the patellar tendon.          How is this treated?    This condition may be treated by:  •Resting your knee and keeping it from bending.      •Wearing a knee brace for several weeks to keep your knee straight.      •Using crutches or a walker to keep weight off your knee.      •Taking over-the-counter or prescription medicines to reduce pain and swelling.      •Doing stretching and strengthening exercises (physical therapy) to restore full movement and strength to your knee.      •Having surgery to repair the tendon if it is ruptured all the way.        Follow these instructions at home:    If you have a brace:     •Wear the brace as told by your health care provider. Remove it only as told by your health care provider.      •Loosen the brace if your toes tingle, become numb, or turn cold and blue.      •Keep the brace clean.    •If the brace is not waterproof:  •Do not let it get wet.      •Cover it with a watertight covering when you take a bath or shower.          Managing pain, stiffness, and swelling    •If directed, put ice on the injured area.  •If you have a removable brace, remove it as told by your health care provider.      •Put ice in a plastic bag.      •Place a towel between your skin and the bag.      •Leave the ice on for 20 minutes, 2–3 times a day.        •Move your toes often to reduce stiffness and swelling.      •Raise (elevate) your knee above the level of your heart while you are sitting or lying down.      Medicines     •Take over-the-counter and prescription medicines only as told by your health care provider.    •Ask your health care provider if the medicine prescribed to you:  •Requires you to avoid driving or using heavy machinery.    •Can cause constipation. You may need to take actions to prevent or treat constipation, such as:  •Drink enough fluid to keep your urine pale yellow.      •Take over-the-counter or prescription medicines.      •Eat foods that are high in fiber, such as beans, whole grains, and fresh fruits and vegetables.      •Limit foods that are high in fat and processed sugars, such as fried or sweet foods.          Activity     • Do not use your knee to support your body weight until your health care provider says that you can. Use crutches or a walker as told by your health care provider.      •Return to your normal activities as told by your health care provider. Ask your health care provider what activities are safe for you.      •Do exercises as told by your health care provider.      General instructions     •Ask your health care provider when it is safe for you to drive.      • Do not use any products that contain nicotine or tobacco, such as cigarettes, e-cigarettes, and chewing tobacco. These can delay healing. If you need help quitting, ask your health care provider.      •Keep all follow-up visits as told by your health care provider. This is important.        How is this prevented?    •Warm up and stretch before being active.      •Cool down and stretch after being active.      •Give your body time to rest between periods of activity.      •Make sure to use equipment that fits you.      •Be safe and responsible while being active. This will help you avoid falls which can damage the tendon.    •Maintain physical fitness, including:  •Strength.      •Flexibility.          Contact a health care provider if:    •Your symptoms do not get better in 2 weeks.        Get help right away if:    •Your symptoms get worse.        Summary    •A patellar tendon tear, also called a patellar tendon rupture, is a tear in the thick band of tissue that connects the kneecap (patella) to the shin bone (tibia).      •Follow instructions for treatment as told by your health care provider. This may include resting your knee, wearing a brace, using crutches or a walker, and doing certain exercises.      • Do not use your knee to support your body weight until your health care provider says that you can.      •Contact a health care provider if your symptoms do not get better or they get worse.      •Keep all follow-up visits as told by your health care provider. This is important.      This information is not intended to replace advice given to you by your health care provider. Make sure you discuss any questions you have with your health care provider.

## 2022-04-17 VITALS — HEART RATE: 75 BPM | DIASTOLIC BLOOD PRESSURE: 81 MMHG | SYSTOLIC BLOOD PRESSURE: 136 MMHG

## 2022-04-17 PROCEDURE — 73502 X-RAY EXAM HIP UNI 2-3 VIEWS: CPT | Mod: 26,RT

## 2022-04-17 PROCEDURE — 73562 X-RAY EXAM OF KNEE 3: CPT | Mod: 26,RT

## 2022-04-17 PROCEDURE — 73552 X-RAY EXAM OF FEMUR 2/>: CPT | Mod: 26,RT

## 2022-04-17 NOTE — ED ADULT NURSE NOTE - OBJECTIVE STATEMENT
pt A&Ox3 states he was playing baseball when he was struck by another playing and it caused stretcher of his right leg in a forcefully manner. pt denies numbness and tingling just states pain is really strong. pt was found to be hyperventilating when arriving back from xray, pt medicated and updated on plan of care awaiting xray results.

## 2022-04-17 NOTE — ED PROCEDURE NOTE - CPROC ED POST RADIOGRAPHY1
Pt arrived to  from ED. Pt able to move from stretcher to bed with some assistance. Pt on 3L NC, denies SOB. Pt drowsy but opens eyes to voice and is oriented. Levo infusing at 0.3 mcg/kg/min. Rhythm NS on monitor, VSS, voices no concerns. See flowsheet for further assessment.   post procedure radiography not performed

## 2022-06-22 ENCOUNTER — EMERGENCY (EMERGENCY)
Facility: HOSPITAL | Age: 32
LOS: 1 days | Discharge: HOME | End: 2022-06-22
Attending: EMERGENCY MEDICINE
Payer: MEDICAID

## 2022-06-22 VITALS
OXYGEN SATURATION: 96 % | TEMPERATURE: 97 F | HEIGHT: 71 IN | SYSTOLIC BLOOD PRESSURE: 130 MMHG | RESPIRATION RATE: 16 BRPM | HEART RATE: 74 BPM | WEIGHT: 259.93 LBS | DIASTOLIC BLOOD PRESSURE: 73 MMHG

## 2022-06-22 PROCEDURE — 72100 X-RAY EXAM L-S SPINE 2/3 VWS: CPT | Mod: 26

## 2022-06-22 PROCEDURE — 99283 EMERGENCY DEPT VISIT LOW MDM: CPT | Mod: 25

## 2022-06-22 PROCEDURE — 72100 X-RAY EXAM L-S SPINE 2/3 VWS: CPT

## 2022-06-22 PROCEDURE — 96372 THER/PROPH/DIAG INJ SC/IM: CPT

## 2022-06-22 PROCEDURE — 99284 EMERGENCY DEPT VISIT MOD MDM: CPT

## 2022-06-22 RX ORDER — DIAZEPAM 5 MG
5 TABLET ORAL ONCE
Refills: 0 | Status: DISCONTINUED | OUTPATIENT
Start: 2022-06-22 | End: 2022-06-22

## 2022-06-22 RX ORDER — METHOCARBAMOL 500 MG/1
2 TABLET, FILM COATED ORAL
Qty: 18 | Refills: 0
Start: 2022-06-22 | End: 2022-06-24

## 2022-06-22 RX ORDER — METHOCARBAMOL 500 MG/1
1000 TABLET, FILM COATED ORAL ONCE
Refills: 0 | Status: COMPLETED | OUTPATIENT
Start: 2022-06-22 | End: 2022-06-22

## 2022-06-22 RX ORDER — KETOROLAC TROMETHAMINE 30 MG/ML
30 SYRINGE (ML) INJECTION ONCE
Refills: 0 | Status: DISCONTINUED | OUTPATIENT
Start: 2022-06-22 | End: 2022-06-22

## 2022-06-22 RX ORDER — LIDOCAINE 4 G/100G
1 CREAM TOPICAL
Qty: 10 | Refills: 0
Start: 2022-06-22 | End: 2022-07-01

## 2022-06-22 RX ORDER — LIDOCAINE 4 G/100G
1 CREAM TOPICAL ONCE
Refills: 0 | Status: COMPLETED | OUTPATIENT
Start: 2022-06-22 | End: 2022-06-22

## 2022-06-22 RX ADMIN — METHOCARBAMOL 1000 MILLIGRAM(S): 500 TABLET, FILM COATED ORAL at 22:51

## 2022-06-22 RX ADMIN — LIDOCAINE 1 PATCH: 4 CREAM TOPICAL at 22:52

## 2022-06-22 RX ADMIN — Medication 30 MILLIGRAM(S): at 22:52

## 2022-06-22 NOTE — ED PROVIDER NOTE - NSFOLLOWUPINSTRUCTIONS_ED_ALL_ED_FT
Back Pain    Back pain is very common in adults. The cause of back pain is rarely dangerous and the pain often gets better over time. The cause of your back pain may not be known and may include strain of muscles or ligaments, degeneration of the spinal disks, or arthritis. Occasionally the pain may radiate down your leg(s). Over-the-counter medicines to reduce pain and inflammation are often the most helpful. Stretching and remaining active frequently helps the healing process.     SEEK IMMEDIATE MEDICAL CARE IF YOU HAVE ANY OF THE FOLLOWING SYMPTOMS: bowel or bladder control problems, unusual weakness or numbness in your arms or legs, nausea or vomiting, abdominal pain, fever, dizziness/lightheadedness. please continue naproxen as you have with robaxin   NOTE ROBAXIN / METHOCARBAMOL WILL MAKE YOU SLEEPY DO NOT DRIVE WHEN TAKING IT   CALL AND FOLLOW UP WITH [facility]   WE WILL CAll YOU BACK IF ANY CHANGES ON OFFICIAL READING       Back Pain    Back pain is very common in adults. The cause of back pain is rarely dangerous and the pain often gets better over time. The cause of your back pain may not be known and may include strain of muscles or ligaments, degeneration of the spinal disks, or arthritis. Occasionally the pain may radiate down your leg(s). Over-the-counter medicines to reduce pain and inflammation are often the most helpful. Stretching and remaining active frequently helps the healing process.     SEEK IMMEDIATE MEDICAL CARE IF YOU HAVE ANY OF THE FOLLOWING SYMPTOMS: bowel or bladder control problems, unusual weakness or numbness in your arms or legs, nausea or vomiting, abdominal pain, fever, dizziness/lightheadedness.

## 2022-06-22 NOTE — ED PROVIDER NOTE - CARE PROVIDER_API CALL
Pantera Dodge; PhD)  Neurosurgery  [address] [place] [address]  Phone: [phone]  Fax: [phone]  Follow Up Time:

## 2022-06-22 NOTE — ED PROVIDER NOTE - PHYSICAL EXAMINATION
Const: AOX3 nontoxic appearing, no apparent respiratory or physical distress. Stable gait , obese male   HEENT: NC/AT. Moist mucous membranes.  Eyes: LEONID. EOMI  Neck: Soft and supple. Full ROM without pain.  Cardiac: Regular rate and regular rhythm. +S1/S2.   Resp: Speaking in full sentences. No evidence of respiratory distress.   Abd: Soft, non-tender, non-distended.   Back: Spine midline and non-tender. No CVAT. no erythema or ecchymosis, left para spine TTP over L2-3 area , LE strength and sensation grossly intact    Skin: No rashes, abrasions or lacerations.  Neuro: Awake, alert & oriented x 3. Moves all extremities symmetrically.

## 2022-06-22 NOTE — ED PROVIDER NOTE - OBJECTIVE STATEMENT
31 y,o male NO SIg Pmh Presents in Er and  c.o LBP S.p tripped  fall last night on the stage on the floor carpet and twist the back . pain goes to his right hip worse by movement  . took one naproxen last night did not helped much . denies any head trauma or LOC. denies any loss of bowel or bladder continence. denies any abd pain or blood in urine

## 2022-06-22 NOTE — ED PROVIDER NOTE - ATTENDING APP SHARED VISIT CONTRIBUTION OF CARE
Patient presents with slip and fall yesterday.  Today with back pain.  Otherwise baseline.  no head injury.  no LOC.  Non toxic.  Well appearing. Neuro is normal.  Mild TTP L3-L5.  No ecchymosis.  As interpreted by undersigned physician, xrays demonstrate no acute pathology. will treat.  will follow up. Uneventful ED observation period. Discussed signs and symptoms and reasons for return with good teachback.

## 2022-06-22 NOTE — ED ADULT NURSE NOTE - OBJECTIVE STATEMENT
AOx4 c/o s/p fall during a "performance" states the fall was yesterday.  pain 8/10 when resting, 10/10 during activity.

## 2022-06-22 NOTE — ED PROVIDER NOTE - PATIENT PORTAL LINK FT
You can access the FollowMyHealth Patient Portal offered by [facility] by registering at the following website: [email or web address]. By joining [employer]’s FollowMyHealth portal, you will also be able to view your health information using other applications (apps) compatible with our system.

## 2022-06-22 NOTE — ED ADULT NURSE NOTE - CHPI ED NUR SYMPTOMS NEG
no abrasion/no bruising/no deformity/no difficulty bearing weight/no fever/no numbness/no tingling/no weakness

## 2022-06-22 NOTE — ED ADULT TRIAGE NOTE - CHIEF COMPLAINT QUOTE
C/O lower back pain after twisting the wrong way then falling while performing earlier today. Pain radiates down right leg. Reports difficulty ambulating. Also has right hand pain.

## 2022-06-22 NOTE — ED PROVIDER NOTE - NS ED ATTENDING STATEMENT MOD
This was a shared visit with the ROLANDO. I reviewed and verified the documentation and independently performed the documented:

## 2022-07-02 ENCOUNTER — EMERGENCY (EMERGENCY)
Facility: HOSPITAL | Age: 32
LOS: 1 days | Discharge: HOME | End: 2022-07-02
Attending: EMERGENCY MEDICINE
Payer: MEDICAID

## 2022-07-02 ENCOUNTER — TRANSCRIPTION ENCOUNTER (OUTPATIENT)
Age: 32
End: 2022-07-02

## 2022-07-02 ENCOUNTER — INPATIENT (INPATIENT)
Facility: HOSPITAL | Age: 32
LOS: 0 days | Discharge: HOME | DRG: 343 | End: 2022-07-03
Attending: STUDENT IN AN ORGANIZED HEALTH CARE EDUCATION/TRAINING PROGRAM | Admitting: STUDENT IN AN ORGANIZED HEALTH CARE EDUCATION/TRAINING PROGRAM
Payer: MEDICAID

## 2022-07-02 VITALS
SYSTOLIC BLOOD PRESSURE: 129 MMHG | DIASTOLIC BLOOD PRESSURE: 83 MMHG | HEIGHT: 71 IN | RESPIRATION RATE: 20 BRPM | TEMPERATURE: 98 F | WEIGHT: 255.96 LBS | OXYGEN SATURATION: 97 % | HEART RATE: 79 BPM

## 2022-07-02 VITALS
HEART RATE: 79 BPM | RESPIRATION RATE: 18 BRPM | WEIGHT: 268.08 LBS | HEIGHT: 71 IN | TEMPERATURE: 99 F | SYSTOLIC BLOOD PRESSURE: 118 MMHG | DIASTOLIC BLOOD PRESSURE: 89 MMHG | OXYGEN SATURATION: 95 %

## 2022-07-02 LAB
ALBUMIN SERPL ELPH-MCNC: 4.3 G/DL — SIGNIFICANT CHANGE UP (ref 3.3–5.2)
ALP SERPL-CCNC: 80 U/L — SIGNIFICANT CHANGE UP (ref 40–120)
ALT FLD-CCNC: 31 U/L — SIGNIFICANT CHANGE UP
ANION GAP SERPL CALC-SCNC: 11 MMOL/L — SIGNIFICANT CHANGE UP (ref 5–17)
ANION GAP SERPL CALC-SCNC: 12 MMOL/L — SIGNIFICANT CHANGE UP (ref 5–17)
APPEARANCE UR: CLEAR — SIGNIFICANT CHANGE UP
APTT BLD: 29.3 SEC — SIGNIFICANT CHANGE UP (ref 27.5–35.5)
AST SERPL-CCNC: 18 U/L — SIGNIFICANT CHANGE UP
BACTERIA # UR AUTO: ABNORMAL
BASOPHILS # BLD AUTO: 0.02 K/UL — SIGNIFICANT CHANGE UP (ref 0–0.2)
BASOPHILS # BLD AUTO: 0.03 K/UL — SIGNIFICANT CHANGE UP (ref 0–0.2)
BASOPHILS NFR BLD AUTO: 0.4 % — SIGNIFICANT CHANGE UP (ref 0–2)
BASOPHILS NFR BLD AUTO: 0.4 % — SIGNIFICANT CHANGE UP (ref 0–2)
BILIRUB SERPL-MCNC: 0.4 MG/DL — SIGNIFICANT CHANGE UP (ref 0.4–2)
BILIRUB UR-MCNC: NEGATIVE — SIGNIFICANT CHANGE UP
BUN SERPL-MCNC: 15.3 MG/DL — SIGNIFICANT CHANGE UP (ref 8–20)
BUN SERPL-MCNC: 18.1 MG/DL — SIGNIFICANT CHANGE UP (ref 8–20)
CALCIUM SERPL-MCNC: 9.1 MG/DL — SIGNIFICANT CHANGE UP (ref 8.6–10.2)
CALCIUM SERPL-MCNC: 9.2 MG/DL — SIGNIFICANT CHANGE UP (ref 8.6–10.2)
CHLORIDE SERPL-SCNC: 100 MMOL/L — SIGNIFICANT CHANGE UP (ref 98–107)
CHLORIDE SERPL-SCNC: 104 MMOL/L — SIGNIFICANT CHANGE UP (ref 98–107)
CO2 SERPL-SCNC: 23 MMOL/L — SIGNIFICANT CHANGE UP (ref 22–29)
CO2 SERPL-SCNC: 23 MMOL/L — SIGNIFICANT CHANGE UP (ref 22–29)
COLOR SPEC: YELLOW — SIGNIFICANT CHANGE UP
COMMENT - URINE: SIGNIFICANT CHANGE UP
CREAT SERPL-MCNC: 1.17 MG/DL — SIGNIFICANT CHANGE UP (ref 0.5–1.3)
CREAT SERPL-MCNC: 1.34 MG/DL — HIGH (ref 0.5–1.3)
DIFF PNL FLD: NEGATIVE — SIGNIFICANT CHANGE UP
EGFR: 72 ML/MIN/1.73M2 — SIGNIFICANT CHANGE UP
EGFR: 85 ML/MIN/1.73M2 — SIGNIFICANT CHANGE UP
EOSINOPHIL # BLD AUTO: 0.07 K/UL — SIGNIFICANT CHANGE UP (ref 0–0.5)
EOSINOPHIL # BLD AUTO: 0.09 K/UL — SIGNIFICANT CHANGE UP (ref 0–0.5)
EOSINOPHIL NFR BLD AUTO: 1.3 % — SIGNIFICANT CHANGE UP (ref 0–6)
EOSINOPHIL NFR BLD AUTO: 1.4 % — SIGNIFICANT CHANGE UP (ref 0–6)
EPI CELLS # UR: SIGNIFICANT CHANGE UP
GLUCOSE SERPL-MCNC: 104 MG/DL — HIGH (ref 70–99)
GLUCOSE SERPL-MCNC: 89 MG/DL — SIGNIFICANT CHANGE UP (ref 70–99)
GLUCOSE UR QL: NEGATIVE MG/DL — SIGNIFICANT CHANGE UP
HCT VFR BLD CALC: 46.6 % — SIGNIFICANT CHANGE UP (ref 39–50)
HCT VFR BLD CALC: 48.4 % — SIGNIFICANT CHANGE UP (ref 39–50)
HGB BLD-MCNC: 16.5 G/DL — SIGNIFICANT CHANGE UP (ref 13–17)
HGB BLD-MCNC: 17 G/DL — SIGNIFICANT CHANGE UP (ref 13–17)
HIV 1 & 2 AB SERPL IA.RAPID: SIGNIFICANT CHANGE UP
IMM GRANULOCYTES NFR BLD AUTO: 0.4 % — SIGNIFICANT CHANGE UP (ref 0–1.5)
IMM GRANULOCYTES NFR BLD AUTO: 0.6 % — SIGNIFICANT CHANGE UP (ref 0–1.5)
INR BLD: 1.05 RATIO — SIGNIFICANT CHANGE UP (ref 0.88–1.16)
KETONES UR-MCNC: NEGATIVE — SIGNIFICANT CHANGE UP
LEUKOCYTE ESTERASE UR-ACNC: ABNORMAL
LIDOCAIN IGE QN: 26 U/L — SIGNIFICANT CHANGE UP (ref 22–51)
LYMPHOCYTES # BLD AUTO: 1.43 K/UL — SIGNIFICANT CHANGE UP (ref 1–3.3)
LYMPHOCYTES # BLD AUTO: 1.8 K/UL — SIGNIFICANT CHANGE UP (ref 1–3.3)
LYMPHOCYTES # BLD AUTO: 26.6 % — SIGNIFICANT CHANGE UP (ref 13–44)
LYMPHOCYTES # BLD AUTO: 28.2 % — SIGNIFICANT CHANGE UP (ref 13–44)
MCHC RBC-ENTMCNC: 30 PG — SIGNIFICANT CHANGE UP (ref 27–34)
MCHC RBC-ENTMCNC: 30.3 PG — SIGNIFICANT CHANGE UP (ref 27–34)
MCHC RBC-ENTMCNC: 35.1 GM/DL — SIGNIFICANT CHANGE UP (ref 32–36)
MCHC RBC-ENTMCNC: 35.4 GM/DL — SIGNIFICANT CHANGE UP (ref 32–36)
MCV RBC AUTO: 85.4 FL — SIGNIFICANT CHANGE UP (ref 80–100)
MCV RBC AUTO: 85.7 FL — SIGNIFICANT CHANGE UP (ref 80–100)
MONOCYTES # BLD AUTO: 0.39 K/UL — SIGNIFICANT CHANGE UP (ref 0–0.9)
MONOCYTES # BLD AUTO: 0.45 K/UL — SIGNIFICANT CHANGE UP (ref 0–0.9)
MONOCYTES NFR BLD AUTO: 6.7 % — SIGNIFICANT CHANGE UP (ref 2–14)
MONOCYTES NFR BLD AUTO: 7.7 % — SIGNIFICANT CHANGE UP (ref 2–14)
NEUTROPHILS # BLD AUTO: 3.13 K/UL — SIGNIFICANT CHANGE UP (ref 1.8–7.4)
NEUTROPHILS # BLD AUTO: 4.36 K/UL — SIGNIFICANT CHANGE UP (ref 1.8–7.4)
NEUTROPHILS NFR BLD AUTO: 61.7 % — SIGNIFICANT CHANGE UP (ref 43–77)
NEUTROPHILS NFR BLD AUTO: 64.6 % — SIGNIFICANT CHANGE UP (ref 43–77)
NITRITE UR-MCNC: NEGATIVE — SIGNIFICANT CHANGE UP
PH UR: 6 — SIGNIFICANT CHANGE UP (ref 5–8)
PLATELET # BLD AUTO: 157 K/UL — SIGNIFICANT CHANGE UP (ref 150–400)
PLATELET # BLD AUTO: 169 K/UL — SIGNIFICANT CHANGE UP (ref 150–400)
POTASSIUM SERPL-MCNC: 3.7 MMOL/L — SIGNIFICANT CHANGE UP (ref 3.5–5.3)
POTASSIUM SERPL-MCNC: 4.2 MMOL/L — SIGNIFICANT CHANGE UP (ref 3.5–5.3)
POTASSIUM SERPL-SCNC: 3.7 MMOL/L — SIGNIFICANT CHANGE UP (ref 3.5–5.3)
POTASSIUM SERPL-SCNC: 4.2 MMOL/L — SIGNIFICANT CHANGE UP (ref 3.5–5.3)
PROT SERPL-MCNC: 7.1 G/DL — SIGNIFICANT CHANGE UP (ref 6.6–8.7)
PROT UR-MCNC: NEGATIVE — SIGNIFICANT CHANGE UP
PROTHROM AB SERPL-ACNC: 12.2 SEC — SIGNIFICANT CHANGE UP (ref 10.5–13.4)
RBC # BLD: 5.44 M/UL — SIGNIFICANT CHANGE UP (ref 4.2–5.8)
RBC # BLD: 5.67 M/UL — SIGNIFICANT CHANGE UP (ref 4.2–5.8)
RBC # FLD: 12 % — SIGNIFICANT CHANGE UP (ref 10.3–14.5)
RBC # FLD: 12.1 % — SIGNIFICANT CHANGE UP (ref 10.3–14.5)
RBC CASTS # UR COMP ASSIST: SIGNIFICANT CHANGE UP /HPF (ref 0–4)
SODIUM SERPL-SCNC: 134 MMOL/L — LOW (ref 135–145)
SODIUM SERPL-SCNC: 139 MMOL/L — SIGNIFICANT CHANGE UP (ref 135–145)
SP GR SPEC: 1.02 — SIGNIFICANT CHANGE UP (ref 1.01–1.02)
UROBILINOGEN FLD QL: NEGATIVE MG/DL — SIGNIFICANT CHANGE UP
WBC # BLD: 5.07 K/UL — SIGNIFICANT CHANGE UP (ref 3.8–10.5)
WBC # BLD: 6.76 K/UL — SIGNIFICANT CHANGE UP (ref 3.8–10.5)
WBC # FLD AUTO: 5.07 K/UL — SIGNIFICANT CHANGE UP (ref 3.8–10.5)
WBC # FLD AUTO: 6.76 K/UL — SIGNIFICANT CHANGE UP (ref 3.8–10.5)
WBC UR QL: ABNORMAL /HPF (ref 0–5)

## 2022-07-02 PROCEDURE — 81001 URINALYSIS AUTO W/SCOPE: CPT

## 2022-07-02 PROCEDURE — 86703 HIV-1/HIV-2 1 RESULT ANTBDY: CPT

## 2022-07-02 PROCEDURE — 74177 CT ABD & PELVIS W/CONTRAST: CPT | Mod: MG

## 2022-07-02 PROCEDURE — 85025 COMPLETE CBC W/AUTO DIFF WBC: CPT

## 2022-07-02 PROCEDURE — 83690 ASSAY OF LIPASE: CPT

## 2022-07-02 PROCEDURE — G1004: CPT

## 2022-07-02 PROCEDURE — 80053 COMPREHEN METABOLIC PANEL: CPT

## 2022-07-02 PROCEDURE — 36415 COLL VENOUS BLD VENIPUNCTURE: CPT

## 2022-07-02 PROCEDURE — 96374 THER/PROPH/DIAG INJ IV PUSH: CPT | Mod: XU

## 2022-07-02 PROCEDURE — 99285 EMERGENCY DEPT VISIT HI MDM: CPT

## 2022-07-02 PROCEDURE — 74177 CT ABD & PELVIS W/CONTRAST: CPT | Mod: 26,MG

## 2022-07-02 PROCEDURE — 87086 URINE CULTURE/COLONY COUNT: CPT

## 2022-07-02 PROCEDURE — 99284 EMERGENCY DEPT VISIT MOD MDM: CPT | Mod: 25

## 2022-07-02 RX ORDER — KETOROLAC TROMETHAMINE 30 MG/ML
15 SYRINGE (ML) INJECTION ONCE
Refills: 0 | Status: DISCONTINUED | OUTPATIENT
Start: 2022-07-02 | End: 2022-07-02

## 2022-07-02 RX ORDER — MORPHINE SULFATE 50 MG/1
4 CAPSULE, EXTENDED RELEASE ORAL ONCE
Refills: 0 | Status: DISCONTINUED | OUTPATIENT
Start: 2022-07-02 | End: 2022-07-02

## 2022-07-02 RX ORDER — DIATRIZOATE MEGLUMINE 180 MG/ML
30 INJECTION, SOLUTION INTRAVESICAL ONCE
Refills: 0 | Status: COMPLETED | OUTPATIENT
Start: 2022-07-02 | End: 2022-07-03

## 2022-07-02 RX ORDER — ONDANSETRON 8 MG/1
4 TABLET, FILM COATED ORAL ONCE
Refills: 0 | Status: COMPLETED | OUTPATIENT
Start: 2022-07-02 | End: 2022-07-02

## 2022-07-02 RX ORDER — SODIUM CHLORIDE 9 MG/ML
1000 INJECTION INTRAMUSCULAR; INTRAVENOUS; SUBCUTANEOUS ONCE
Refills: 0 | Status: COMPLETED | OUTPATIENT
Start: 2022-07-02 | End: 2022-07-02

## 2022-07-02 RX ADMIN — MORPHINE SULFATE 4 MILLIGRAM(S): 50 CAPSULE, EXTENDED RELEASE ORAL at 22:00

## 2022-07-02 RX ADMIN — ONDANSETRON 4 MILLIGRAM(S): 8 TABLET, FILM COATED ORAL at 22:00

## 2022-07-02 RX ADMIN — Medication 15 MILLIGRAM(S): at 11:25

## 2022-07-02 NOTE — ED STATDOCS - OBJECTIVE STATEMENT
31 YOM w/ no PMHx. presents to ED c/o constant LLQ abdominal pain x2 days. PT reports urgent care visit yesterday w/ concern for possible STD. PT reports nausea, diarrhea, and persistent back pain s/p accident last week. Denies fever, chills, vomiting, chest pain, SOB, dysuria, hematuria, penile discharge, testicular pain, daily meds, allergies to meds. PT reports FMHx. of DM at young age and cardiac disease but is not followed by PMD.

## 2022-07-02 NOTE — ED ADULT TRIAGE NOTE - BP NONINVASIVE DIASTOLIC (MM HG)
83
34 yo  at 39+1 presenting for scheduled IOL for IUGR (10%, AC 3%, normal doppler). Denies ctx, lof, vb. +fm.   and noted long and closed admitted for PO Cytotec induction

## 2022-07-02 NOTE — ED STATDOCS - PHYSICAL EXAMINATION
VITAL SIGNS: I have reviewed nursing notes and confirm.  CONSTITUTIONAL:  in no acute distress.  SKIN: Skin exam is warm and dry, no acute rash.  HEAD: Normocephalic; atraumatic.  EYES: PERRL, EOM intact; conjunctiva and sclera clear.  ENT: No nasal discharge; airway clear. Throat clear.  NECK: Supple; non tender.    CARD: Regular rate and rhythm.  RESP: No wheezes,  no rales or rhonchi.   ABD:  soft; non-distended; (+) LLQ TTP  EXT: Normal ROM. No clubbing, cyanosis or edema.  NEURO: Alert, oriented. Grossly unremarkable. No focal deficits.  moves all extremities,  normal gait   PSYCH: Cooperative, appropriate.

## 2022-07-02 NOTE — ED STATDOCS - PATIENT PORTAL LINK FT
You can access the FollowMyHealth Patient Portal offered by [facility] by registering at the following website: [email or web address]. By joining [employer]’s FollowMyHealth portal, you will also be able to view your health information using other applications (apps) compatible with our system.

## 2022-07-02 NOTE — ED STATDOCS - NS ED ROS FT
Review of Systems  •	CONSTITUTIONAL - no  fever, no diaphoresis, no weight change  •	SKIN - no rash  •	HEMATOLOGIC - no bleeding, no bruising  •	EYES - no eye pain, no blurred vision  •	ENT - no change in hearing, no pain  •	RESPIRATORY - no shortness of breath, no cough  •	CARDIAC - no chest pain, no palpitations  •	GI - (+) abd pain, no nausea, no vomiting, (+) diarrhea, no constipation, no bleeding  •	GENITO-URINARY - no discharge, no dysuria; no hematuria,   •	ENDO - no polydipsia, no polyuria, no heat/no cold intolerance  •	MUSCULOSKELETAL - no joint pain, no swelling, no redness  •	NEUROLOGIC - no weakness, no headache, no anesthesia, no paresthesias  •	PSYCH - no anxiety, non suicidal, non homicidal, no hallucination, no depression

## 2022-07-02 NOTE — ED ADULT TRIAGE NOTE - CHIEF COMPLAINT QUOTE
pt c/o RLQ abdominal pain, pt was diagnosed w/ appendicitis earlier today @ [facility], pt was d/c'd and told to come back if pain became worse. pain worsened an hour ago.

## 2022-07-02 NOTE — ED STATDOCS - PROGRESS NOTE DETAILS
seen by surgery attending, recommend repeat CT with oral contrast to further evaluate Pt is initially seen by DR young and surgery team  Ro appe- agreed With hx, PE and plan   seen the pt at the bed side resting CT hare is resulted ,. called Surgery team made them aware of early appe . as per resident they reviewing the pt chart will call me back

## 2022-07-02 NOTE — ED STATDOCS - CLINICAL SUMMARY MEDICAL DECISION MAKING FREE TEXT BOX
Pt with recent CT scan that showed prominence of the midportion of the appendix dilatated. Clinically indicated for appendicitis. Will discuss with surgery, might need repeat CT scan, pain control, labs and reassess.

## 2022-07-02 NOTE — ED STATDOCS - PHYSICAL EXAMINATION
Vital Signs per nursing documentation  Gen: well appearing, no acute distress  HEENT: NCAT, MMM  Cardiac: regular rate rhythm, normal S1S2  Chest: clear to auscultation bilateral, no wheezes or crackles  Abdomen: soft, +RLQ tenderness non distended  Extremity: no gross deformity, good perfusion  Skin: no rash  Neuro: nonfocal neuro exam, gait steady

## 2022-07-02 NOTE — CONSULT NOTE ADULT - SUBJECTIVE AND OBJECTIVE BOX
ACUTE CARE SURGERY CONSULT     HPI: 31y Male present to ED with ABdominal pain    31 Male with no reported medical history reports abdominal pain for 1 day, previous to episode of pain reports 1 day of diarrhea, bloody, with nausea and emesis as well as subjective fever, which later developed abdominal pain starting in the left side, then lower bilateral abdominal pain with finally sharp right lower quadrant abdominal pain. Patient was concerned given the diarrhea and similar episode about 5 years ago when he was treated with antibiotics, endorses that it was mentioned to him the diagnosis of possible appendicitis. This time around endorses diarrhea came first as well as episode of emesis  Patient denies fevers/chills, denies lightheadedness/dizziness, denies SOB/chest pain, denies nausea/vomiting, denies constipation/diarrhea.  ***    ROS: 10-system review is otherwise negative except HPI above.      PAST MEDICAL & SURGICAL HISTORY:  No pertinent past medical history      No significant past surgical history        FAMILY HISTORY:    Family history not pertinent as reviewed with the patient.    SOCIAL HISTORY:  Denies any toxic habits    ALLERGIES: NKA No Known Allergies      HOME MEDICATIONS: ***  Home Medications:      --------------------------------------------------------------------------------------------    PHYSICAL EXAM:   General: NAD, Lying in bed comfortably  Neuro: A+Ox3  HEENT: EOMI, PERRLA, MMM  Cardio: RRR  Resp: Non labored breathing on RA  GI/Abd: Soft, NT/ND, no rebound/guarding, no masses palpated  Vascular: All 4 extremities warm and well perfused.   Pelvis: stable  Musculoskeletal: All 4 extremities moving spontaneously, no limitations, no spinal tenderness.  --------------------------------------------------------------------------------------------    LABS                 16.5   6.76   )----------(  169       ( 2022 23:05 )               46.6      134    |  100    |  15.3   ----------------------------<  104        ( 2022 11:38 )  4.2     |  23.0   |  1.17     Ca    9.2        ( 2022 11:38 )    TPro  7.1    /  Alb  4.3    /  TBili  0.4    /  DBili  x      /  AST  18     /  ALT  31     /  AlkPhos  80     ( 2022 11:38 )    LIVER FUNCTIONS - ( 2022 11:38 )  Alb: 4.3 g/dL / Pro: 7.1 g/dL / ALK PHOS: 80 U/L / ALT: 31 U/L / AST: 18 U/L / GGT: x           PT/INR -  12.2 sec / 1.05 ratio   ( 2022 23:05 )       PTT -  29.3 sec   ( 2022 23:05 )  CAPILLARY BLOOD GLUCOSE        Urinalysis Basic - ( 2022 11:15 )    Color: Yellow / Appearance: Clear / S.025 / pH: x  Gluc: x / Ketone: Negative  / Bili: Negative / Urobili: Negative mg/dL   Blood: x / Protein: Negative / Nitrite: Negative   Leuk Esterase: Small / RBC: 0-2 /HPF / WBC 11-25 /HPF   Sq Epi: x / Non Sq Epi: Occasional / Bacteria: Occasional          --------------------------------------------------------------------------------------------  IMAGING  ***    ASSESSMENT: Patient is a 31y old male with ***    PLAN:    - Admit to [facility] floor/ICU***  - NPO/IVF  - pain control  - DVT ppx  - OOB/ambulate  - strict I/Os  - Plan discussed with Attending,      Consult called at:**  Consult seen at:** ACUTE CARE SURGERY CONSULT     HPI: 31y Male present to ED with ABdominal pain    31 Male with no reported medical history reports abdominal pain for 1 day, previous to episode of pain reports 1 day of diarrhea, bloody, with nausea and emesis as well as subjective fever, which later developed abdominal pain starting in the left side, then lower bilateral abdominal pain with finally sharp right lower quadrant abdominal pain. Patient was concerned given the diarrhea and similar episode about 5 years ago when he was treated with antibiotics, endorses that it was mentioned to him the diagnosis of possible appendicitis. This time around endorses diarrhea came first as well as episode of emesis, subsequently developed pain, denies hematuria, dysuria or urinary frequency.    Patient has family history of colitis (likely UC?) in father and grandfather    ROS: 10-system review is otherwise negative except HPI above.      PAST MEDICAL & SURGICAL HISTORY:  No pertinent past medical history      No significant past surgical history        FAMILY HISTORY:    As HPI    SOCIAL HISTORY:  Denies any toxic habits    ALLERGIES: NKA No Known Allergies      HOME MEDICATIONS: ***  Home Medications:      --------------------------------------------------------------------------------------------    PHYSICAL EXAM:   General: NAD, Lying in bed comfortably  Neuro: A+Ox3  HEENT: EOMI, PERRLA, MMM  Cardio: RRR  Resp: Non labored breathing on RA  GI/Abd: Soft, RLQ tenderness/ND, no rebound/guarding, no masses palpated, negative psoas/obturator sign  Vascular: All 4 extremities warm and well perfused.   Pelvis: stable  Musculoskeletal: All 4 extremities moving spontaneously, no limitations, no spinal tenderness.  --------------------------------------------------------------------------------------------    LABS                 16.5   6.76   )----------(  169       ( 2022 23:05 )               46.6      134    |  100    |  15.3   ----------------------------<  104        ( 2022 11:38 )  4.2     |  23.0   |  1.17     Ca    9.2        ( 2022 11:38 )    TPro  7.1    /  Alb  4.3    /  TBili  0.4    /  DBili  x      /  AST  18     /  ALT  31     /  AlkPhos  80     ( 2022 11:38 )    LIVER FUNCTIONS - ( 2022 11:38 )  Alb: 4.3 g/dL / Pro: 7.1 g/dL / ALK PHOS: 80 U/L / ALT: 31 U/L / AST: 18 U/L / GGT: x           PT/INR -  12.2 sec / 1.05 ratio   ( 2022 23:05 )       PTT -  29.3 sec   ( 2022 23:05 )  CAPILLARY BLOOD GLUCOSE        Urinalysis Basic - ( 2022 11:15 )    Color: Yellow / Appearance: Clear / S.025 / pH: x  Gluc: x / Ketone: Negative  / Bili: Negative / Urobili: Negative mg/dL   Blood: x / Protein: Negative / Nitrite: Negative   Leuk Esterase: Small / RBC: 0-2 /HPF / WBC 11-25 /HPF   Sq Epi: x / Non Sq Epi: Occasional / Bacteria: Occasional          --------------------------------------------------------------------------------------------  IMAGING  CT with midappendix prominence    ASSESSMENT: Patient is a 31y old male with abdominal pain, consulted due to midappendix prominence in previous CT scan, normal WBC, and diarrhea started 1 day before pain, not typical picture for appendicitis, as well as strong family history of colitis (UC?). Patient is however tender in RLQ, given unclear clinical picture recommend CT with PO and IV contrast to delineate appendix better. Based on subsequent exam will depend if operative intervention is recommended. If scan is completely normal appendix can dx    PLAN:    - CT PO IV contrast  No ABx    - Will follow

## 2022-07-02 NOTE — CONSULT NOTE ADULT - ATTENDING COMMENTS
Patient with concern for acute appendicitis.   *Does not have classic story; ?concern for colitis; however, appendectomy may also facilitate future management by eliminating appendix as source of sx (ie. should this by related to colitis).     --admit  --NPO, IVF  --IV abx  --add on for lap appy.

## 2022-07-02 NOTE — ED ADULT NURSE NOTE - OBJECTIVE STATEMENT
Pt c/o LLQ pain worsening since Wednesday.  Pt reports accompanying diarrhea.  Denies nausea, vomiting, urinary symptoms.

## 2022-07-02 NOTE — ED ADULT NURSE NOTE - OBJECTIVE STATEMENT
Pt A&Ox3 states he was in the hospital earlier today and was DX with appendicitis, but was sent home and told if the pain became worse to return to Emergency Department. pt state pain is unbearable and has not gone away since being dc'd home. pt updated on plan of care awaiting lab and scan results.

## 2022-07-02 NOTE — ED STATDOCS - CLINICAL SUMMARY MEDICAL DECISION MAKING FREE TEXT BOX
PT p/w LLQ abdominal pain a/w diarrhea. PT sent from urgent care w/ report of possible STD but no meds were prescribed. Will get blood work, UA, GC/Chlamydia, STD, CT abdomen, Toradol for pain.

## 2022-07-02 NOTE — ED STATDOCS - ATTENDING APP SHARED VISIT CONTRIBUTION OF CARE
I, Concepcion Roa, performed a face to face bedside interview with this patient regarding history of present illness, review of symptoms and relevant past medical, social and family history.  I completed an independent physical examination. Medical decision making, follow-up on ordered tests (ie labs, radiologic studies) and re-evaluation of the patient's status has been communicated to the ACP.  Disposition of the patient will be based on test outcome and response to ED interventions.

## 2022-07-02 NOTE — ED STATDOCS - NSFOLLOWUPINSTRUCTIONS_ED_ALL_ED_FT
Abdominal Pain    Please bear in mind the is a small risk that you have early appendicitis.  Please monitor your abdominal pain closely.  Many things can cause abdominal pain. Many times, abdominal pain is not caused by a disease and will improve without treatment. Your health care provider will do a physical exam to determine if there is a dangerous cause of your pain; blood tests and imaging may help determine the cause of your pain. However, in many cases, no cause may be found and you may need further testing as an outpatient. Monitor your abdominal pain for any changes.     SEEK IMMEDIATE MEDICAL CARE IF YOU HAVE ANY OF THE FOLLOWING SYMPTOMS: worsening abdominal pain, uncontrollable vomiting, profuse diarrhea, inability to have bowel movements or pass gas, black or bloody stools, fever accompanying chest pain or back pain, or fainting. These symptoms may represent a serious problem that is an emergency. Do not wait to see if the symptoms will go away. Get medical help right away. Call 911 and do not drive yourself to the hospital.

## 2022-07-02 NOTE — ED ADULT NURSE NOTE - CHIEF COMPLAINT QUOTE
pt c/o RLQ abdominal pain, pt was diagnosed w/ appendicitis earlier today @ [facility], pt was d/c'd and told to come back if pain became worse. pain worsened an hour ago.

## 2022-07-02 NOTE — ED STATDOCS - PROGRESS NOTE DETAILS
[place]: Patient feeling slightly better.  Workup is unremarkable for any emergent process.  There is mild prominence of mid- appendix, discussed finding with patient.  He states the pain is mostly on the LEFT side, and he gestures to left hip area more than abdomen.  He wonders if he strained the area playing basketball.  Patient is now feeling improved and wishes to leave.  Patient / family members have capacity.    Patient/family was given full / strict return precautions, counseled on red flag symptoms such as LOC, fever, severe pain ruth abdominal pain on the right, n/v, or focal deficits and advised to return to the ED for these reasons or any reason that was concerning to them. Patient/family was informed of all significant and incidental findings found on this workup today and all results were reviewed.   All questions were answered, advised to make close follow up with their primary care provider and specialty clinics (as applicable) to follow up with this visit and continue investigation/treatment.   Patient/family has shown adequate understanding and is agreeable to the plan.

## 2022-07-02 NOTE — ED STATDOCS - ATTENDING CONTRIBUTION TO CARE
I, José Kumari, performed the initial face to face bedside interview with this patient regarding history of present illness, review of symptoms and relevant past medical, social and family history.  I completed an independent physical examination.  I was the initial provider who evaluated this patient. I have signed out the follow up of any pending tests (i.e. labs, radiological studies) to the resident.  I have communicated the patient’s plan of care and disposition with the resident.

## 2022-07-02 NOTE — ED STATDOCS - OBJECTIVE STATEMENT
30 y/o male no pmhx, c/o worsening RLQ abdominal pain. Seen here earlier today c/o left sided abdominal pain. Had a CT abdomen/pelvis with IV contrast showed mild prominence of the midportion of the appendix. Denies any abdominal surgical hx.

## 2022-07-03 ENCOUNTER — RESULT REVIEW (OUTPATIENT)
Age: 32
End: 2022-07-03

## 2022-07-03 ENCOUNTER — TRANSCRIPTION ENCOUNTER (OUTPATIENT)
Age: 32
End: 2022-07-03

## 2022-07-03 VITALS
RESPIRATION RATE: 17 BRPM | OXYGEN SATURATION: 99 % | SYSTOLIC BLOOD PRESSURE: 111 MMHG | DIASTOLIC BLOOD PRESSURE: 51 MMHG | HEART RATE: 80 BPM

## 2022-07-03 DIAGNOSIS — K35.80 UNSPECIFIED ACUTE APPENDICITIS: ICD-10-CM

## 2022-07-03 LAB
BLD GP AB SCN SERPL QL: SIGNIFICANT CHANGE UP
CULTURE RESULTS: SIGNIFICANT CHANGE UP
SARS-COV-2 RNA SPEC QL NAA+PROBE: SIGNIFICANT CHANGE UP
SPECIMEN SOURCE: SIGNIFICANT CHANGE UP

## 2022-07-03 PROCEDURE — 44970 LAPAROSCOPY APPENDECTOMY: CPT

## 2022-07-03 PROCEDURE — 88304 TISSUE EXAM BY PATHOLOGIST: CPT | Mod: 26

## 2022-07-03 PROCEDURE — 74177 CT ABD & PELVIS W/CONTRAST: CPT | Mod: 26,MA

## 2022-07-03 DEVICE — STAPLER COVIDIEN TRI-STAPLE CURVED 45MM TAN RELOAD: Type: IMPLANTABLE DEVICE | Status: FUNCTIONAL

## 2022-07-03 DEVICE — STAPLER COVIDIEN TA 30 RED RELOAD: Type: IMPLANTABLE DEVICE | Status: FUNCTIONAL

## 2022-07-03 DEVICE — CLIP APPLIER ETHICON LIGACLIP 10MM: Type: IMPLANTABLE DEVICE | Status: FUNCTIONAL

## 2022-07-03 RX ORDER — ONDANSETRON 8 MG/1
4 TABLET, FILM COATED ORAL EVERY 6 HOURS
Refills: 0 | Status: DISCONTINUED | OUTPATIENT
Start: 2022-07-03 | End: 2022-07-03

## 2022-07-03 RX ORDER — METHOCARBAMOL 500 MG/1
500 TABLET, FILM COATED ORAL EVERY 8 HOURS
Refills: 0 | Status: DISCONTINUED | OUTPATIENT
Start: 2022-07-03 | End: 2022-07-03

## 2022-07-03 RX ORDER — CEFOTETAN DISODIUM 1 G
2 VIAL (EA) INJECTION EVERY 12 HOURS
Refills: 0 | Status: DISCONTINUED | OUTPATIENT
Start: 2022-07-03 | End: 2022-07-03

## 2022-07-03 RX ORDER — HYDROMORPHONE HYDROCHLORIDE 2 MG/ML
0.5 INJECTION INTRAMUSCULAR; INTRAVENOUS; SUBCUTANEOUS
Refills: 0 | Status: DISCONTINUED | OUTPATIENT
Start: 2022-07-03 | End: 2022-07-03

## 2022-07-03 RX ORDER — ONDANSETRON 8 MG/1
4 TABLET, FILM COATED ORAL ONCE
Refills: 0 | Status: DISCONTINUED | OUTPATIENT
Start: 2022-07-03 | End: 2022-07-03

## 2022-07-03 RX ORDER — ACETAMINOPHEN 500 MG
650 TABLET ORAL EVERY 6 HOURS
Refills: 0 | Status: DISCONTINUED | OUTPATIENT
Start: 2022-07-03 | End: 2022-07-03

## 2022-07-03 RX ORDER — MORPHINE SULFATE 50 MG/1
2 CAPSULE, EXTENDED RELEASE ORAL EVERY 4 HOURS
Refills: 0 | Status: DISCONTINUED | OUTPATIENT
Start: 2022-07-03 | End: 2022-07-03

## 2022-07-03 RX ORDER — MORPHINE SULFATE 50 MG/1
4 CAPSULE, EXTENDED RELEASE ORAL EVERY 4 HOURS
Refills: 0 | Status: DISCONTINUED | OUTPATIENT
Start: 2022-07-03 | End: 2022-07-03

## 2022-07-03 RX ORDER — SODIUM CHLORIDE 9 MG/ML
1000 INJECTION, SOLUTION INTRAVENOUS
Refills: 0 | Status: DISCONTINUED | OUTPATIENT
Start: 2022-07-03 | End: 2022-07-03

## 2022-07-03 RX ORDER — ACETAMINOPHEN 500 MG
2 TABLET ORAL
Qty: 0 | Refills: 0 | DISCHARGE
Start: 2022-07-03

## 2022-07-03 RX ORDER — IBUPROFEN 200 MG
600 TABLET ORAL EVERY 8 HOURS
Refills: 0 | Status: DISCONTINUED | OUTPATIENT
Start: 2022-07-03 | End: 2022-07-03

## 2022-07-03 RX ORDER — CEFOTETAN DISODIUM 1 G
2 VIAL (EA) INJECTION ONCE
Refills: 0 | Status: COMPLETED | OUTPATIENT
Start: 2022-07-03 | End: 2022-07-03

## 2022-07-03 RX ORDER — ACETAMINOPHEN 500 MG
1000 TABLET ORAL ONCE
Refills: 0 | Status: DISCONTINUED | OUTPATIENT
Start: 2022-07-03 | End: 2022-07-03

## 2022-07-03 RX ORDER — CEFOTETAN DISODIUM 1 G
VIAL (EA) INJECTION
Refills: 0 | Status: DISCONTINUED | OUTPATIENT
Start: 2022-07-03 | End: 2022-07-03

## 2022-07-03 RX ORDER — KETOROLAC TROMETHAMINE 30 MG/ML
30 SYRINGE (ML) INJECTION ONCE
Refills: 0 | Status: DISCONTINUED | OUTPATIENT
Start: 2022-07-03 | End: 2022-07-03

## 2022-07-03 RX ADMIN — DIATRIZOATE MEGLUMINE 30 MILLILITER(S): 180 INJECTION, SOLUTION INTRAVESICAL at 00:04

## 2022-07-03 RX ADMIN — Medication 650 MILLIGRAM(S): at 12:42

## 2022-07-03 RX ADMIN — SODIUM CHLORIDE 125 MILLILITER(S): 9 INJECTION, SOLUTION INTRAVENOUS at 07:11

## 2022-07-03 RX ADMIN — ONDANSETRON 4 MILLIGRAM(S): 8 TABLET, FILM COATED ORAL at 15:40

## 2022-07-03 RX ADMIN — SODIUM CHLORIDE 125 MILLILITER(S): 9 INJECTION, SOLUTION INTRAVENOUS at 07:47

## 2022-07-03 RX ADMIN — SODIUM CHLORIDE 1000 MILLILITER(S): 9 INJECTION INTRAMUSCULAR; INTRAVENOUS; SUBCUTANEOUS at 00:04

## 2022-07-03 RX ADMIN — Medication 100 GRAM(S): at 07:47

## 2022-07-03 RX ADMIN — Medication 650 MILLIGRAM(S): at 11:42

## 2022-07-03 NOTE — PROGRESS NOTE ADULT - ASSESSMENT
Patient is a 31y old male with abdominal pain, consulted due to midappendix prominence in previous CT scan, normal WBC, and diarrhea started 1 day before pain, not typical picture for appendicitis, as well as strong family history of colitis (UC?). Patient's repeat CT with PO contrast showing mildly dilated appendix suggestive of early appendicitis.    Plan:  -admit to OR for lap appy  -NPO/IVF  -pain control  -strict Is/Os  -continue home meds  -trend labs, replete electrolytes as needed  -encourage OOB  -incentive spirometry  -DVT ppx: SCDs

## 2022-07-03 NOTE — PATIENT PROFILE ADULT - FALL HARM RISK - FALLEN IN PAST
Test Reason : 

Blood Pressure : ***/*** mmHG

Vent. Rate : 080 BPM     Atrial Rate : 080 BPM

   P-R Int : 178 ms          QRS Dur : 094 ms

    QT Int : 406 ms       P-R-T Axes : 038 -33 109 degrees

   QTc Int : 468 ms

 

Normal sinus rhythm

Left axis deviation

Left ventricular hypertrophy with repolarization abnormality

Abnormal ECG

 

Confirmed by LINDA MURRAY M.D. ([id number]),  ROSMERY RHOADES ([id number]) on 3/17/2018 2:21:51 PM

 

Referred By:             Confirmed By:LINDA MURRAY M.D.
No

## 2022-07-03 NOTE — H&P ADULT - NSHPLABSRESULTS_GEN_ALL_CORE
LABS                 16.5   6.76   )----------(  169       ( 2022 23:05 )               46.6      134    |  100    |  15.3   ----------------------------<  104        ( 2022 11:38 )  4.2     |  23.0   |  1.17     Ca    9.2        ( 2022 11:38 )    TPro  7.1    /  Alb  4.3    /  TBili  0.4    /  DBili  x      /  AST  18     /  ALT  31     /  AlkPhos  80     ( 2022 11:38 )    LIVER FUNCTIONS - ( 2022 11:38 )  Alb: 4.3 g/dL / Pro: 7.1 g/dL / ALK PHOS: 80 U/L / ALT: 31 U/L / AST: 18 U/L / GGT: x           PT/INR -  12.2 sec / 1.05 ratio   ( 2022 23:05 )       PTT -  29.3 sec   ( 2022 23:05 )  CAPILLARY BLOOD GLUCOSE        Urinalysis Basic - ( 2022 11:15 )    Color: Yellow / Appearance: Clear / S.025 / pH: x  Gluc: x / Ketone: Negative  / Bili: Negative / Urobili: Negative mg/dL   Blood: x / Protein: Negative / Nitrite: Negative   Leuk Esterase: Small / RBC: 0-2 /HPF / WBC 11-25 /HPF   Sq Epi: x / Non Sq Epi: Occasional / Bacteria: Occasional          --------------------------------------------------------------------------------------------  IMAGING  CT with midappendix prominence

## 2022-07-03 NOTE — ED ADULT NURSE REASSESSMENT NOTE - NS ED NURSE REASSESS COMMENT FT1
report given to rn amanda at 7:52. rr even and unlabored. anox4. pt moved to cdu 8 L. rn made aware transfer. pt educated on plan of care, pt able to successfully teach back plan of care to RN, RN will continue to reeducate pt during hospital stay.

## 2022-07-03 NOTE — H&P ADULT - ASSESSMENT
ASSESSMENT: Patient is a 31y old male with abdominal pain, consulted due to midappendix prominence in previous CT scan, normal WBC, and diarrhea started 1 day before pain, not typical picture for appendicitis, as well as strong family history of colitis (UC?). Patient is however tender in RLQ, given unclear clinical picture repeated CT still showing mid prominence, given age  and persistent tenderness will offer operative intervention    PLAN:    admit  cefotetan before or  IVF  NPO  OR

## 2022-07-03 NOTE — ED ADULT NURSE REASSESSMENT NOTE - NS ED NURSE REASSESS COMMENT FT1
assumed care of pt at 7:20. report received from nixon fretias. charting as noted. rr even and unlabored. anox4. continued cardiac monitoring in place. awaiting abx and bed assignment. no apparent distress noted. pt educated on plan of care, pt able to successfully teach back plan of care to RN, RN will continue to reeducate pt during hospital stay. skin warm to touch

## 2022-07-03 NOTE — CHART NOTE - NSCHARTNOTEFT_GEN_A_CORE
Surgery Preop Note    Patient is a 31y old  Male who presents with a chief complaint of   Diagnosis: Appendicitis  Procedure: lap Appy  Surgeon: Augustina                          16.5   6.76  )-----------( 169      ( 2022 23:05 )             46.6     07-02    139  |  104  |  18.1  ----------------------------<  89  3.7   |  23.0  |  1.34<H>    Ca    9.1      2022 23:05    TPro  7.1  /  Alb  4.3  /  TBili  0.4  /  DBili  x   /  AST  18  /  ALT  31  /  AlkPhos  80  07-02    PT/INR - ( 2022 23:05 )   PT: 12.2 sec;   INR: 1.05 ratio         PTT - ( 2022 23:05 )  PTT:29.3 sec  Urinalysis Basic - ( 2022 11:15 )    Color: Yellow / Appearance: Clear / S.025 / pH: x  Gluc: x / Ketone: Negative  / Bili: Negative / Urobili: Negative mg/dL   Blood: x / Protein: Negative / Nitrite: Negative   Leuk Esterase: Small / RBC: 0-2 /HPF / WBC 11-25 /HPF   Sq Epi: x / Non Sq Epi: Occasional / Bacteria: Occasional        [x ] Type & Screen  [x]pRBCs  [x] CBC  [x ] BMP  [x] PT/PTT/INR  [x ] Chest X-ray  [x ] EKG  [x ] NPO/IVF  [x] Med Clearance not necessary  [x ] Added on to OR Schedule  [x] COVID UTD    Assessment & Plan:  31yMale with appendicitis  -For OR 7/3/22

## 2022-07-03 NOTE — H&P ADULT - HISTORY OF PRESENT ILLNESS
31 Male with no reported medical history reports abdominal pain for 1 day, previous to episode of pain reports 1 day of diarrhea, bloody, with nausea and emesis as well as subjective fever, which later developed abdominal pain starting in the left side, then lower bilateral abdominal pain with finally sharp right lower quadrant abdominal pain. Patient was concerned given the diarrhea and similar episode about 5 years ago when he was treated with antibiotics, endorses that it was mentioned to him the diagnosis of possible appendicitis. This time around endorses diarrhea came first as well as episode of emesis, subsequently developed pain, denies hematuria, dysuria or urinary frequency.    Patient has family history of colitis (likely UC?) in father and grandfather    PAST MEDICAL & SURGICAL HISTORY:  No pertinent past medical history      No significant past surgical history        FAMILY HISTORY:    As HPI    SOCIAL HISTORY:  Denies any toxic habits    ALLERGIES: NKA No Known Allergies      HOME MEDICATIONS: ***  Home Medications:

## 2022-07-03 NOTE — PROGRESS NOTE ADULT - SUBJECTIVE AND OBJECTIVE BOX
Patient seen and examined. He continue to have some pain, planned for lap appy today.    Vitals:  Vital Signs Last 24 Hrs  T(C): 36.4 (03 Jul 2022 05:41), Max: 37.1 (02 Jul 2022 21:11)  T(F): 97.5 (03 Jul 2022 05:41), Max: 98.8 (02 Jul 2022 21:11)  HR: 68 (03 Jul 2022 05:41) (68 - 79)  BP: 142/93 (03 Jul 2022 05:41) (118/89 - 142/93)  BP(mean): --  RR: 18 (03 Jul 2022 05:41) (18 - 20)  SpO2: 97% (03 Jul 2022 05:41) (95% - 97%)    Labs:  07-02    139  |  104  |  18.1  ----------------------------<  89  3.7   |  23.0  |  1.34<H>    Ca    9.1      02 Jul 2022 23:05    TPro  7.1  /  Alb  4.3  /  TBili  0.4  /  DBili  x   /  AST  18  /  ALT  31  /  AlkPhos  80  07-02                            16.5   6.76  )-----------( 169      ( 02 Jul 2022 23:05 )             46.6     Exam:  Gen: pt lying in bed, alert, in NAD  Resp: unlabored  CVS: RRR  Abd: soft, ND, RLQ tenderness  Ext: moving all extremities spontaneously, sensation intact, pulses 2+

## 2022-07-03 NOTE — ASU DISCHARGE PLAN (ADULT/PEDIATRIC) - CARE PROVIDER_API CALL
Lj Hooker)  Surgery; Surgical Critical Care  [address] [place] [address]  Phone: [phone]  Fax: [phone]  Follow Up Time: 2 weeks

## 2022-07-03 NOTE — H&P ADULT - NSHPPHYSICALEXAM_GEN_ALL_CORE
PHYSICAL EXAM:   General: NAD, Lying in bed comfortably  Neuro: A+Ox3  HEENT: EOMI, PERRLA, MMM  Cardio: RRR  Resp: Non labored breathing on RA  GI/Abd: Soft, RLQ tenderness/ND, no rebound/guarding, no masses palpated, negative psoas/obturator sign  Vascular: All 4 extremities warm and well perfused.   Pelvis: stable  Musculoskeletal: All 4 extremities moving spontaneously, no limitations, no spinal tenderness.

## 2022-07-03 NOTE — ASU DISCHARGE PLAN (ADULT/PEDIATRIC) - NS MD DC FALL RISK RISK
For information on Fall & Injury Prevention, visit: [email or web address] OR  [email or web address] OR  https://www.cdc.gov/steadi/patient.html

## 2022-07-17 ENCOUNTER — EMERGENCY (EMERGENCY)
Facility: HOSPITAL | Age: 32
LOS: 1 days | Discharge: HOME | End: 2022-07-17
Attending: STUDENT IN AN ORGANIZED HEALTH CARE EDUCATION/TRAINING PROGRAM
Payer: MEDICAID

## 2022-07-17 VITALS — HEIGHT: 71 IN | WEIGHT: 250 LBS

## 2022-07-17 PROCEDURE — 99282 EMERGENCY DEPT VISIT SF MDM: CPT

## 2022-07-17 PROCEDURE — 99053 MED SERV 10PM-8AM 24 HR FAC: CPT

## 2022-07-17 PROCEDURE — 99283 EMERGENCY DEPT VISIT LOW MDM: CPT

## 2022-07-17 NOTE — ED ADULT TRIAGE NOTE - HISTORY OF COVID-19 VACCINATION
CHIEF COMPLAINT:  Chronic low back pain.      HISTORY OF PRESENT ILLNESS:  The patient has significant low back pain. The patient is recommended to have lumbar interventional pain procedures. Patient [facility].    PAST MEDICAL HISTORY: reviewed and documented in chart.   SOCIAL HISTORY: reviewed and documented in chart.   ALLERGIES: reviewed and documented in chart.   CURRENT MEDICATIONS: reviewed and documented in chart.   FAMILY HISTORY: reviewed and documented in chart.   REVIEW OF SYSTEMS: A 14-system review was negative except for problem mentioned above.     PHYSICAL EXAM:  VITALS: See nursing note; I have personally reviewed patient's OR holding area vital signs.   GENERAL: Patient is alert and oriented and in no obvious distress  PSYCH: Patient’s mood is appropriate with normal affect.   HEEN: eyes, nose and ears are without discharge.   LUNGS: non labored breathing, good chest expansion.  HEART: extremities warm and well perfused  ABDOMEN: soft and non-distended.   LUMBAR SPINE: Tenderness to palpation along lumbar paraspinals.      ASSESSMENT:  lumbar facet arthropathy.    PLAN:  1. Bilateral L3,4,5 medial branch block for diagnostic purpose.  2. Risks and benefits explained to patient. Consent obtained.   3. Re-evaluation in two weeks in clinic or call in 2-3 days.   Yes

## 2022-07-17 NOTE — ED ADULT TRIAGE NOTE - CHIEF COMPLAINT QUOTE
Ambulatory s/p appendectomy 2 weeks PTA and would like the wounds checked and steri-strips removed. Denies fevers, no exudate.

## 2022-07-18 VITALS
RESPIRATION RATE: 16 BRPM | TEMPERATURE: 98 F | HEART RATE: 74 BPM | DIASTOLIC BLOOD PRESSURE: 88 MMHG | OXYGEN SATURATION: 98 % | SYSTOLIC BLOOD PRESSURE: 130 MMHG

## 2022-07-18 NOTE — ED PROVIDER NOTE - NSICDXPASTSURGICALHX_GEN_ALL_CORE_FT
PAST SURGICAL HISTORY:  No significant past surgical history        PAST SURGICAL HISTORY:  S/P laparoscopic appendectomy

## 2022-07-18 NOTE — ED PROVIDER NOTE - CARE PROVIDER_API CALL
Lj Hooker)  Surgery; Surgical Critical Care  [address] [place] [id number]  Phone: [phone]  Fax: [phone]  Established Patient  Follow Up Time: Routine

## 2022-07-18 NOTE — ED PROVIDER NOTE - MUSCULOSKELETAL, MLM
coagulation(Bleeding disorder R/T clinical cond/anti-coags) Spine appears normal, range of motion is not limited, no muscle or joint tenderness

## 2022-07-18 NOTE — ED PROVIDER NOTE - SKIN, MLM
Skin normal color for race, warm, dry and intact. No evidence of rash. Appendectomy steri-strips and sutures. Skin normal color for race, warm, dry and intact. No evidence of rash. Surgical sites are well appearing ( no odor, swelling, induration, tenderness) and intact.

## 2022-07-18 NOTE — ED PROVIDER NOTE - NSFOLLOWUPINSTRUCTIONS_ED_ALL_ED_FT
1) Follow up with your doctor in 1-2 days  2) Return to the ER for worsening or concerning symptoms      Sutured Wound Care      Sutures are stitches that can be used to close wounds. Taking care of your wound properly can help to prevent pain and infection. It can also help your wound to heal more quickly. Follow instructions from your health care provider about how to care for your sutured wound.      Supplies needed:    •Soap and water.      •A clean bandage (dressing), if needed.      •Antibiotic ointment.      •A clean towel.        How to care for your sutured wound     •Keep the wound completely dry for the first 24 hours, or for as long as directed by your health care provider. After 24–48 hours, you may shower or bathe as directed by your health care provider. Do not soak or submerge the wound in water until the sutures have been removed.    •After the first 24 hours, clean the wound once a day, or as often as directed by your health care provider, using the following steps:  •Wash the wound with soap and water.      •Rinse the wound with water to remove all soap.      •Pat the wound dry with a clean towel. Do not rub the wound.        •After cleaning the wound, apply a thin layer of antibiotic ointment as directed by your health care provider. This will prevent infection and keep the dressing from sticking to the wound.    •Follow instructions from your health care provider about how to change your dressing:  •Wash your hands with soap and water. If soap and water are not available, use hand .      •Change your dressing at least once a day, or as often as told by your health care provider. If your dressing gets wet or dirty, change it.      •Leave sutures and other skin closures, such as adhesive tape or skin glue, in place. These skin closures may need to stay in place for 2 weeks or longer. If adhesive strip edges start to loosen and curl up, you may trim the loose edges. Do not remove adhesive strips completely unless your health care provider tells you to do that.      •Check your wound every day for signs of infection. Watch for:  •Redness, swelling, or pain.      •Fluid or blood.      •Warmth.      •Pus or a bad smell.        •Have the sutures removed as directed by your health care provider.        Follow these instructions at home:    Medicines     •Take or apply over-the-counter and prescription medicines only as told by your health care provider.      •If you were prescribed an antibiotic medicine or ointment, take or apply it as told by your health care provider. Do not stop using the antibiotic even if your condition improves.      General instructions     •To help reduce scarring after your wound heals, cover your wound with clothing or apply sunscreen of at least 30 SPF whenever you are outside.      • Do not scratch or pick at your wound.      •Avoid stretching your wound.      •Raise (elevate) the injured area above the level of your heart while you are sitting or lying down, if possible.      •Drink enough fluids to keep your urine clear or pale yellow.      •Keep all follow-up visits as told by your health care provider. This is important.        Contact a health care provider if:    •You received a tetanus shot and you have swelling, severe pain, redness, or bleeding at the injection site.      •Your wound breaks open.      •You have redness, swelling, or pain around your wound.      •You have fluid or blood coming from your wound.      •Your wound feels warm to the touch.      •You have a fever.      •You notice something coming out of your wound, such as wood or glass.      •You have pain that does not get better with medicine.      •The skin near your wound changes color.      •You need to change your dressing very frequently due to a lot of fluid, blood, or pus draining from the wound.      •You develop a new rash.      •You develop numbness around the wound.        Get help right away if:    •You develop severe swelling around your wound.      •You have pus or a bad smell coming from your wound.      •Your pain suddenly gets worse and is severe.      •You develop painful lumps near your wound or anywhere on your body.      •You have a red streak going away from your wound.    •The wound is on your hand or foot and:  •You cannot properly move a finger or toe.      •Your fingers or toes look pale or bluish.      •You have numbness that is spreading down your hand, foot, fingers, or toes.          Summary    •Sutures are stitches that can be used to close wounds.      •Taking care of your wound properly can help to prevent pain and infection.      •Keep the wound completely dry for the first 24 hours, or for as long as directed by your health care provider. After 24–48 hours, you may shower or bathe as directed by your health care provider.      This information is not intended to replace advice given to you by your health care provider. Make sure you discuss any questions you have with your health care provider.

## 2022-07-18 NOTE — ED PROVIDER NOTE - OBJECTIVE STATEMENT
32 y/o male with no pertinent PMHx presents for wound check after appendectomy 2 weeks ago. Denies fevers, chills, urinary problems, no discharge. Pt did not have followup appointment after surgery. No other complaints at this time. 30 y/o male with no pertinent PMHx presents for wound check after appendectomy 2 weeks ago. Pt presents w steri strips and sutures to be removed. denies any concerning symptoms, fevers, chills, urinary symptoms, discharge, n/v. Pt was unsure when he had followup with surgeons.

## 2022-07-18 NOTE — ED PROVIDER NOTE - PATIENT PORTAL LINK FT
You can access the FollowMyHealth Patient Portal offered by [facility] by registering at the following website: [email or web address] By joining [facility]’s FollowMyHealth portal, you will also be able to view your health information using other applications (apps) compatible with our system.

## 2022-07-18 NOTE — ED PROVIDER NOTE - CLINICAL SUMMARY MEDICAL DECISION MAKING FREE TEXT BOX
Pt with no signs of acute surgical complications. Has followup as recommended from DC instructions from 2 weeks ago. Pt with no signs of acute surgical complications. Instructed to f/u with his surgeon as noted in the instructions from 2 weeks ago in the [place]..

## 2022-07-20 DIAGNOSIS — Z90.49 ACQUIRED ABSENCE OF OTHER SPECIFIED PARTS OF DIGESTIVE TRACT: Chronic | ICD-10-CM

## 2022-07-20 LAB — SURGICAL PATHOLOGY STUDY: SIGNIFICANT CHANGE UP

## 2022-08-11 PROCEDURE — 96374 THER/PROPH/DIAG INJ IV PUSH: CPT

## 2022-08-11 PROCEDURE — 86850 RBC ANTIBODY SCREEN: CPT

## 2022-08-11 PROCEDURE — 80048 BASIC METABOLIC PNL TOTAL CA: CPT

## 2022-08-11 PROCEDURE — C1889: CPT

## 2022-08-11 PROCEDURE — 86900 BLOOD TYPING SEROLOGIC ABO: CPT

## 2022-08-11 PROCEDURE — 99285 EMERGENCY DEPT VISIT HI MDM: CPT

## 2022-08-11 PROCEDURE — U0003: CPT

## 2022-08-11 PROCEDURE — 88304 TISSUE EXAM BY PATHOLOGIST: CPT

## 2022-08-11 PROCEDURE — 86901 BLOOD TYPING SEROLOGIC RH(D): CPT

## 2022-08-11 PROCEDURE — 96375 TX/PRO/DX INJ NEW DRUG ADDON: CPT

## 2022-08-11 PROCEDURE — 85730 THROMBOPLASTIN TIME PARTIAL: CPT

## 2022-08-11 PROCEDURE — 36415 COLL VENOUS BLD VENIPUNCTURE: CPT

## 2022-08-11 PROCEDURE — 85025 COMPLETE CBC W/AUTO DIFF WBC: CPT

## 2022-08-11 PROCEDURE — C9399: CPT

## 2022-08-11 PROCEDURE — 85610 PROTHROMBIN TIME: CPT

## 2022-08-11 PROCEDURE — U0005: CPT

## 2022-08-11 PROCEDURE — 74177 CT ABD & PELVIS W/CONTRAST: CPT | Mod: MA

## 2022-09-20 ENCOUNTER — EMERGENCY (EMERGENCY)
Facility: HOSPITAL | Age: 32
LOS: 1 days | Discharge: HOME | End: 2022-09-20
Attending: EMERGENCY MEDICINE
Payer: MEDICAID

## 2022-09-20 ENCOUNTER — EMERGENCY (EMERGENCY)
Facility: HOSPITAL | Age: 32
LOS: 1 days | Discharge: HOME | End: 2022-09-20
Attending: STUDENT IN AN ORGANIZED HEALTH CARE EDUCATION/TRAINING PROGRAM
Payer: MEDICAID

## 2022-09-20 VITALS
HEART RATE: 93 BPM | WEIGHT: 279.11 LBS | HEIGHT: 71 IN | DIASTOLIC BLOOD PRESSURE: 90 MMHG | RESPIRATION RATE: 18 BRPM | TEMPERATURE: 98 F | OXYGEN SATURATION: 95 % | SYSTOLIC BLOOD PRESSURE: 130 MMHG

## 2022-09-20 VITALS
SYSTOLIC BLOOD PRESSURE: 153 MMHG | DIASTOLIC BLOOD PRESSURE: 106 MMHG | TEMPERATURE: 98 F | WEIGHT: 272.05 LBS | OXYGEN SATURATION: 96 % | HEIGHT: 71 IN | RESPIRATION RATE: 20 BRPM | HEART RATE: 80 BPM

## 2022-09-20 DIAGNOSIS — Z90.49 ACQUIRED ABSENCE OF OTHER SPECIFIED PARTS OF DIGESTIVE TRACT: Chronic | ICD-10-CM

## 2022-09-20 PROCEDURE — 99283 EMERGENCY DEPT VISIT LOW MDM: CPT

## 2022-09-20 PROCEDURE — 99284 EMERGENCY DEPT VISIT MOD MDM: CPT

## 2022-09-20 RX ORDER — GABAPENTIN 400 MG/1
1 CAPSULE ORAL
Qty: 42 | Refills: 0
Start: 2022-09-20 | End: 2022-10-03

## 2022-09-20 RX ORDER — IBUPROFEN 200 MG
600 TABLET ORAL ONCE
Refills: 0 | Status: COMPLETED | OUTPATIENT
Start: 2022-09-20 | End: 2022-09-20

## 2022-09-20 RX ORDER — BENZTROPINE MESYLATE 1 MG
2 TABLET ORAL ONCE
Refills: 0 | Status: COMPLETED | OUTPATIENT
Start: 2022-09-20 | End: 2022-09-20

## 2022-09-20 RX ORDER — VALACYCLOVIR 500 MG/1
1 TABLET, FILM COATED ORAL
Qty: 30 | Refills: 0
Start: 2022-09-20 | End: 2022-09-29

## 2022-09-20 RX ORDER — IBUPROFEN 200 MG
1 TABLET ORAL
Qty: 28 | Refills: 0
Start: 2022-09-20 | End: 2022-09-26

## 2022-09-20 RX ORDER — VALACYCLOVIR 500 MG/1
1000 TABLET, FILM COATED ORAL ONCE
Refills: 0 | Status: COMPLETED | OUTPATIENT
Start: 2022-09-20 | End: 2022-09-20

## 2022-09-20 RX ORDER — GABAPENTIN 400 MG/1
300 CAPSULE ORAL ONCE
Refills: 0 | Status: COMPLETED | OUTPATIENT
Start: 2022-09-20 | End: 2022-09-20

## 2022-09-20 RX ORDER — DIPHENHYDRAMINE HCL 50 MG
50 CAPSULE ORAL ONCE
Refills: 0 | Status: COMPLETED | OUTPATIENT
Start: 2022-09-20 | End: 2022-09-20

## 2022-09-20 RX ADMIN — Medication 50 MILLIGRAM(S): at 02:42

## 2022-09-20 RX ADMIN — Medication 600 MILLIGRAM(S): at 12:42

## 2022-09-20 RX ADMIN — Medication 2 MILLIGRAM(S): at 02:44

## 2022-09-20 RX ADMIN — GABAPENTIN 300 MILLIGRAM(S): 400 CAPSULE ORAL at 12:42

## 2022-09-20 RX ADMIN — Medication 50 MILLIGRAM(S): at 02:41

## 2022-09-20 RX ADMIN — Medication 40 MILLIGRAM(S): at 13:05

## 2022-09-20 RX ADMIN — VALACYCLOVIR 1000 MILLIGRAM(S): 500 TABLET, FILM COATED ORAL at 12:42

## 2022-09-20 NOTE — ED PROVIDER NOTE - NSFOLLOWUPINSTRUCTIONS_ED_ALL_ED_FT
Follow up with PMD.  Take medication as prescribed.  keep rash covered.  Come back with new or worsening symptoms.

## 2022-09-20 NOTE — ED PROVIDER NOTE - NS ED ROS FT
Constitutional: no fever, no sweats, and no chills.  HEENT: no throat edema, no tongue edema  CV: no chest pain, no edema.  Resp: no cough, no dyspnea  GI: no abdominal pain, no nausea and no vomiting.  MSK: no msk pain, no weakness  Skin: no lesions, and +rash.  Neuro: no headache, no dizziness  ROS otherwise negative except as noted in HPI.

## 2022-09-20 NOTE — ED PROVIDER NOTE - OBJECTIVE STATEMENT
Pt is a 32y M with no PMH presenting for rash to lower back and buttock. Pt was evaluated in ED early this morning and was advised that he had an allergic rash. he was given steroids and has been using a cream which he does not know the name of. He reports pain to the rash like a burning sensation. He admits to having chicken pox and shingles in the past. He denies any fever/chills. pt has red raised rash to pilonidal area into b/l buttocks. Denies any other complaints.

## 2022-09-20 NOTE — ED PROVIDER NOTE - OBJECTIVE STATEMENT
33yo M presents for rash. Reports he scratched his lower back on the bathtub, rash developed over about 3 hours, burning maculopapular rash developed over lower back and superior buttocks. Denies SOB, swelling of throat or tongue, or rash anywhere else. Has not taken any medications for it yet. No known allergies. 31yo M presents for rash. Reports he scratched his lower back on the bathtub, rash developed over about 3 hours, burning maculopapular rash developed over lower back and superior buttocks. Denies SOB, swelling of throat or tongue, or rash anywhere else. Reports he put a chemical ice pack on the rash which seems to have made it worse. No known allergies.

## 2022-09-20 NOTE — ED PROVIDER NOTE - PHYSICAL EXAMINATION
General: well appearing, NAD  Head: NC/AT  Cardiac: RRR, no m/r/g  Respiratory: CTABL, equal chest wall expansions, no conversational dyspnea  Abdomen: soft, ND, NT  Back: maculopapular rash of b/l flanks extending to superior buttocks  Neuro: AAOx3, coordinated movement of all 4 extremities  Psych: calm, cooperative, normal affect

## 2022-09-20 NOTE — ED PROVIDER NOTE - NSFOLLOWUPINSTRUCTIONS_ED_ALL_ED_FT
1. Take the prednisone 2 tablets once/day. Take the benadryl every 6 hours for 24 hours, then as needed for itching/rash.  2. Follow up with your doctor within 2-3 days.   3. Return to the ED for any new or worsening symptoms.     Allergic Reaction    An allergic reaction is an abnormal reaction to a substance (allergen) by the body's defense system. Common allergens include medicines, food, insect bites or stings, and blood products. The body releases certain proteins into the blood that can cause a variety of symptoms such as an itchy rash, wheezing, swelling of the face/lips/tongue/throat, abdominal pain, nausea or vomiting. An allergic reaction is usually treated with medication. If your health care provider prescribed you an epinephrine injection device, make sure to keep it with you at all times.    SEEK IMMEDIATE MEDICAL CARE IF YOU HAVE ANY OF THE FOLLOWING SYMPTOMS: allergic reaction severe enough that required you to use epinephrine, tightness in your chest, swelling around your lips/tongue/throat, abdominal pain, vomiting or diarrhea, or lightheadedness/dizziness. These symptoms may represent a serious problem that is an emergency. Do not wait to see if the symptoms will go away. Use your auto-injector pen or anaphylaxis kit as you have been instructed. Call 911 and do not drive yourself to the hospital.

## 2022-09-20 NOTE — ED ADULT TRIAGE NOTE - CHIEF COMPLAINT QUOTE
Pt c/o painful, burning rash on mid lower back, was here in ED yesterday, was prescribed cream and states pain has worsened now.

## 2022-09-20 NOTE — ED PROVIDER NOTE - PATIENT PORTAL LINK FT
You can access the FollowMyHealth Patient Portal offered by [facility] by registering at the following website: [email or web address]. By joining [employer]’s FollowMyHealth portal, you will also be able to view your health information using other applications (apps) compatible with our system.

## 2022-09-20 NOTE — ED ADULT TRIAGE NOTE - CHIEF COMPLAINT QUOTE
Coming to Ed C/O hives to lower back. Itchiness and red. Denies SOB or feelings of his throat closing. No known allergies

## 2022-09-20 NOTE — ED PROVIDER NOTE - PATIENT PORTAL LINK FT
You can access the FollowMyHealth Patient Portal offered by [facility] by registering at the following website: [email or web address]. By joining [employer]’s FollowMyHealth portal, you will also be able to view your health information using other applications (apps) compatible with our system.

## 2022-09-20 NOTE — ED PROVIDER NOTE - NS ED ATTENDING STATEMENT MOD
This was a shared visit with the ROLADNO. I reviewed and verified the documentation and independently performed the documented:

## 2022-09-20 NOTE — ED ADULT NURSE NOTE - CHIEF COMPLAINT QUOTE
Pt c/o painful, burning rash on mid lower back, was here in ED yesterday, was prescribed cream and states pain has worsened now. I have personally evaluated and examined the patient. The Attending was available to me as a supervising provider if needed.

## 2022-09-20 NOTE — ED PROVIDER NOTE - NSFOLLOWUPCLINICS_GEN_ALL_ED_FT
Scheduled mammogram exam   
[id number] [place] [address]  Phone: [phone]  Fax:

## 2022-09-20 NOTE — ED PROVIDER NOTE - ATTENDING APP SHARED VISIT CONTRIBUTION OF CARE
lumbosacral region midline with extension to left gluteal region with erythematous blanching patch, tender, described as neuropathic pain, suspect early shingles rash; will treat accordingly, all precautions discussed at length, agree with acp plan of care    This was a shared visit with ROLANDO. I reviewed and verified the documentation and independently performed the documented history/exam/mdm.

## 2022-09-20 NOTE — ED PROVIDER NOTE - ATTENDING CONTRIBUTION TO CARE
31 yo male presenting for evaluation of acute burning itchy rash to the sacral region. Patient denies recent trauma or new exposures.   Gen: Alert, NAD  Head: NC, AT, PERRL, EOMI, normal lids/conjunctiva  ENT: normal hearing, patent oropharynx without erythema/exudate, uvula midline  Neck: +supple, no tenderness/meningismus/JVD, +Trachea midline  Pulm: Bilateral BS, normal resp effort, no wheeze/stridor/retractions  CV: RRR,   Abd: soft, NT/ND  Mskel: no edema/erythema/cyanosis  Skin: red blotchy rash noted over the sacral area  Neuro: AAOx3, no gross sensory/motor deficits  I personally saw the patient with the resident, and completed the key components of the history and physical exam. I then discussed the management plan with the resident.

## 2022-09-20 NOTE — ED PROVIDER NOTE - CLINICAL SUMMARY MEDICAL DECISION MAKING FREE TEXT BOX
31yo M presents for burning maculopapular rash of lower back. Benadryl, prednisone, cogentin given. Reassurance, discharge with outpatient follow-up.

## 2022-09-20 NOTE — ED ADULT NURSE NOTE - OBJECTIVE STATEMENT
pt reports "My shingles hurts" reports painful rash was seen yesterday for rash states its not improved with cream prescribed. PT medicated with prednisone, gabapentin, valacyclovir, and ibuprofen as ordered. tolerated well.

## 2022-10-09 NOTE — ED ADULT NURSE NOTE - CCCP TRG CHIEF CMPLNT
Pulmonary Progress Note    Date of admission  10/3/2022    Chief Complaint  60 y.o. 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  Patient had a positve 10 L lymph node.  She has been undergoing radiation therapy starting 8/16, and had completed 37 of her 40 treatments prior to her admission.  Patient states that over the past couple weeks she has had increasing shortness of breath.  Review of her CT scan shows that she has diffuse emphysema with increased interstitial groundglass, concerning for pulmonary edema.  Echocardiogram shows a mildly reduced EF of 40 to 45%, but notably patient has a dilated RV with RV systolic dysfunction.    CT also showing pulmonary embolism with 7 cm cavitary consolidation in the right middle lobe, possible infarct, and tracheal debris.  Furthermore, she has a new right-sided pleural effusion.   The atelectasis of the left upper lobe was seen on CT on 8/14 is improved, pleural effusion on the left side persists and is loculated in appearance.  Patient reports that she uses as needed nebulizers for her COPD, and does not have a pulmonologist. She continues to smoke.  Today, she reports that she is having increased shortness of breath, would like a nebulizer treatment.  She states that overall she feels improved.  She also reports that she has had an increase dry cough, related to radiation therapy, this is consistent as she has tracheal debris    10/7-patient reports that her breathing feels somewhat better today, however, she is still visibly tachypneic with pursed lip breathing.  She states that she did urinate a lot overnight with the Lasix.  She is asking to ambulate.  10/9-yesterday, patient continued to have rapid A. fib and flutter.  She was treated with beta-blockers per cardiology.  There was concern  for ongoing RV strain from PE, repeat echo was largely unchanged, TAPSE 1.2.  Patient reports that she is feeling much better today.  She says that her breathing is not as labored.  She does have an appointment with radiation tomorrow, would recommend the patient stay here until after her radiation appointment    Interval Problem Update  Reviewed last 24 hour events:  As above    Review of Systems  Review of Systems   Constitutional:  Negative for chills and fever.   Respiratory:  Positive for cough and shortness of breath. Negative for wheezing.    Cardiovascular:  Positive for palpitations. Negative for chest pain.   Gastrointestinal: Negative.    Musculoskeletal: Negative.    Neurological: Negative.  Negative for headaches.   Psychiatric/Behavioral: Negative.        Vital Signs for last 24 hours   Temp:  [36.5 °C (97.7 °F)-36.9 °C (98.4 °F)] 36.6 °C (97.9 °F)  Pulse:  [60-96] 60  Resp:  [18] 18  BP: (119-136)/(71-78) 136/76  SpO2:  [88 %-97 %] 93 %    Physical Exam   Physical Exam  Constitutional:       Appearance: Normal appearance.   HENT:      Head: Normocephalic.   Eyes:      Conjunctiva/sclera: Conjunctivae normal.   Cardiovascular:      Rate and Rhythm: Regular rhythm. Tachycardia present.   Pulmonary:      Effort: Pulmonary effort is normal.      Breath sounds: No stridor. Wheezing present.      Comments: Poor air movement  Abdominal:      Palpations: Abdomen is soft.   Skin:     General: Skin is warm.   Neurological:      General: No focal deficit present.      Mental Status: She is alert and oriented to person, place, and time.   Psychiatric:         Mood and Affect: Mood normal.         Behavior: Behavior normal.       Medications  Current Facility-Administered Medications   Medication Dose Route Frequency Provider Last Rate Last Admin    levalbuterol (XOPENEX) 1.25 MG/3ML nebulizer solution 1.25 mg  1.25 mg Nebulization Q4H PRN (RT) Álvaro Ruiz D.O.        ipratropium (ATROVENT) 0.02 % nebulizer  solution 0.5 mg  0.5 mg Nebulization 4X/DAY PRN Álvaro Ruiz D.O.        atorvastatin (LIPITOR) tablet 40 mg  40 mg Oral Q EVENING Álvaro Ruiz D.O.        losartan (COZAAR) tablet 12.5 mg  12.5 mg Oral Q DAY Dorita Guthrie M.D.   12.5 mg at 10/09/22 1131    metoprolol tartrate (LOPRESSOR) tablet 50 mg  50 mg Oral TWICE DAILY Álvaro Ruiz D.O.   50 mg at 10/09/22 0600    predniSONE (DELTASONE) tablet 40 mg  40 mg Oral DAILY Damaris Early D.O.   40 mg at 10/09/22 0451    Metoprolol Tartrate (LOPRESSOR) injection 5 mg  5 mg Intravenous Q5 MIN PRN Linsey M Wegener, A.P.R.N.        levalbuterol (XOPENEX) 1.25 MG/3ML nebulizer solution 1.25 mg  1.25 mg Nebulization Q2HRS PRN (RT) Álvaro Ruiz D.O.        ipratropium (ATROVENT) 0.02 % nebulizer solution 0.5 mg  0.5 mg Nebulization Q2HRS PRN (RT) Álvaro Ruiz D.O.        rivaroxaban (XARELTO) tablet 15 mg  15 mg Oral BID WITH MEALS Álvaro Ruiz D.O.   15 mg at 10/09/22 0532    Followed by    [START ON 10/26/2022] rivaroxaban (XARELTO) tablet 20 mg  20 mg Oral PM MEAL Álvaro Ruiz D.O.        benzonatate (TESSALON) capsule 100 mg  100 mg Oral TID PRN Benedicto Casiano D.O.   100 mg at 10/06/22 2106    Respiratory Therapy Consult   Nebulization Continuous RT Paula Mohan M.D.        senna-docusate (PERICOLACE or SENOKOT S) 8.6-50 MG per tablet 2 Tablet  2 Tablet Oral BID Paula Mohan M.D.   2 Tablet at 10/09/22 0451    And    polyethylene glycol/lytes (MIRALAX) PACKET 1 Packet  1 Packet Oral QDAY PRN Paula Mohan M.D.        And    magnesium hydroxide (MILK OF MAGNESIA) suspension 30 mL  30 mL Oral QDAY PRN Paula Mohan M.D.        And    bisacodyl (DULCOLAX) suppository 10 mg  10 mg Rectal QDAY PRN Paual Mohan M.D.        acetaminophen (Tylenol) tablet 650 mg  650 mg Oral Q6HRS PRN Paula Mohan M.D.   650 mg at 10/07/22 0531    ondansetron (ZOFRAN) syringe/vial injection 4 mg  4 mg Intravenous Q4HRS PRN Paula HART  CELY Mohan        ondansetron (ZOFRAN ODT) dispertab 4 mg  4 mg Oral Q4HRS PRN Paula Mohan M.D.        melatonin tablet 5 mg  5 mg Oral Nightly Paula Mohan M.D.   5 mg at 10/08/22 2023    traZODone (DESYREL) tablet 50 mg  50 mg Oral QHS PRN Paula Mohan M.D.   50 mg at 10/08/22 2023       Fluids    Intake/Output Summary (Last 24 hours) at 10/9/2022 1233  Last data filed at 10/9/2022 0600  Gross per 24 hour   Intake 390 ml   Output --   Net 390 ml         Laboratory          Recent Labs     10/07/22  0150 10/08/22  0232 10/09/22  0658   SODIUM 135 138 136   POTASSIUM 4.3 4.7 4.8   CHLORIDE 101 101 102   CO2 25 25 26   BUN 23* 23* 26*   CREATININE 0.56 0.53 0.55   MAGNESIUM 2.0 1.8 1.9   PHOSPHORUS 2.7 2.9 2.8   CALCIUM 8.5 8.3* 8.6       Recent Labs     10/07/22  0150 10/08/22  0232 10/09/22  0658   GLUCOSE 171* 134* 90       Recent Labs     10/07/22  0150 10/08/22  0232 10/09/22  0658   WBC 7.4 6.9 5.7   NEUTSPOLYS 84.60* 79.30* 76.00*   LYMPHOCYTES 2.40* 1.70* 2.50*   MONOCYTES 10.10 14.90* 16.80*   EOSINOPHILS 0.00 0.10 0.00   BASOPHILS 0.50 0.70 0.70       Recent Labs     10/07/22  0150 10/08/22  0232 10/09/22  0658   RBC 3.07* 3.11* 3.21*   HEMOGLOBIN 9.8* 9.9* 10.2*   HEMATOCRIT 29.7* 30.4* 31.6*   PLATELETCT 279 280 267         Imaging  X-Ray:  I have personally reviewed the images and compared with prior images.    Assessment/Plan  * Acute pulmonary embolism with acute cor pulmonale (HCC)- (present on admission)  Assessment & Plan  PE is provoked, patient has a DVT, related to malignancy  Echo showing dilated RV and decreased systolic function, repeat echo largely unchanged    Continue with anticoagulation  Patient will need a follow-up echo within the next couple of months, per cardiology    Pulmonary hypertension (HCC)  Assessment & Plan  Multifactorial, patient has a mildly reduced left ventricular systolic function, she also has chronic hypoxemia related to emphysema, at this time she  has acute pulmonary emboli, which can also increase her pulmonary pressures and RV function  RVSP is only mildly elevated to 35 by echo, repeat is largely unchanged    Continue with anticoagulation for pulmonary embolism  Continue with oxygen therapy        Acute exacerbation of chronic obstructive pulmonary disease (COPD) ([facility])  Assessment & Plan  Unclear if patient is currently in exacerbation of COPD, as she appears to be fluid overloaded on imaging and clinically    Continue with scheduled Xopenex and ipratropium  Solu-Medrol started 10/3/2022, will change to prednisone 40 mg daily for 10 days  Patient should be discharged with a combination LABA LAMA ICS, as she is currently only on as needed medications, Trelegy would be easiest for patient, should be covered by Medicaid    Acute hypoxemic respiratory failure (HCC)  Assessment & Plan  Multifactorial in the setting of COPD, possibly an exacerbation, pulmonary embolism, pulmonary infarction, and pulmonary embolism    Continue with oxygen, wean as tolerated  Incentive spirometry and chest physiotherapy  Mobilization as tolerated by patient  Plan for DC with home oxygen    Pulmonary infarct ([facility])  Assessment & Plan  Patient has a 7 cm consolidation on the right with areas of cavitation, likely pulmonary infarction due to pulmonary emboli, cavitary pneumonia is also on the differential, however, patient has not been febrile nor had a white count.  She was tachycardic on admission, however, that seems to be related to her pulmonary embolism.  Patient may not mount a robust immune reaction as she is immunocompromise due to chemotherapy    Continue to monitor, patient may need more oxygen due to the consolidation  Repeat CT in 4 to 6 weeks      Tobacco use disorder- (present on admission)  Assessment & Plan  Discussed tobacco cessation with patient, she understands the risks of continuing to smoke    Primary lung squamous cell carcinoma, left (HCC)- (present on  admission)  Assessment & Plan  Patient last received her chemotherapy about 2 weeks ago, she reports that it was stopped due to a low white blood cell count  She is continuing with radiation therapy    Incentive spirometry and chest physiotherapy daily to help patient clear her airways  Continue with chemoradiation per team         VTE:  NOAC    I have performed a physical exam and reviewed and updated ROS and Plan today (10/9/2022). In review of yesterday's note (10/8/2022), there are no changes except as documented above.     We will sign off. Please do not hesitate to contact us if we can be of any further assistance. I am most easily reached via [employer] secure messaging application.      Damaris Early, DO   Pulmonary and Critical Care     leg pain/hip pain/injury

## 2022-10-11 ENCOUNTER — EMERGENCY (EMERGENCY)
Facility: HOSPITAL | Age: 32
LOS: 1 days | Discharge: HOME | End: 2022-10-11
Attending: EMERGENCY MEDICINE
Payer: MEDICAID

## 2022-10-11 VITALS
SYSTOLIC BLOOD PRESSURE: 131 MMHG | WEIGHT: 270.07 LBS | OXYGEN SATURATION: 96 % | HEART RATE: 81 BPM | HEIGHT: 71 IN | DIASTOLIC BLOOD PRESSURE: 83 MMHG | TEMPERATURE: 98 F | RESPIRATION RATE: 16 BRPM

## 2022-10-11 DIAGNOSIS — Z90.49 ACQUIRED ABSENCE OF OTHER SPECIFIED PARTS OF DIGESTIVE TRACT: Chronic | ICD-10-CM

## 2022-10-11 PROCEDURE — 99283 EMERGENCY DEPT VISIT LOW MDM: CPT

## 2022-10-11 PROCEDURE — 73030 X-RAY EXAM OF SHOULDER: CPT

## 2022-10-11 PROCEDURE — 99284 EMERGENCY DEPT VISIT MOD MDM: CPT

## 2022-10-11 PROCEDURE — 73030 X-RAY EXAM OF SHOULDER: CPT | Mod: 26,LT

## 2022-10-11 RX ORDER — IBUPROFEN 200 MG
600 TABLET ORAL ONCE
Refills: 0 | Status: COMPLETED | OUTPATIENT
Start: 2022-10-11 | End: 2022-10-11

## 2022-10-11 RX ORDER — LIDOCAINE 4 G/100G
1 CREAM TOPICAL ONCE
Refills: 0 | Status: COMPLETED | OUTPATIENT
Start: 2022-10-11 | End: 2022-10-11

## 2022-10-11 RX ADMIN — LIDOCAINE 1 PATCH: 4 CREAM TOPICAL at 09:58

## 2022-10-11 RX ADMIN — Medication 600 MILLIGRAM(S): at 09:58

## 2022-10-11 NOTE — ED PROVIDER NOTE - PHYSICAL EXAMINATION
Gen: No acute distress, non toxic  HEENT: Mucous membranes moist, pink conjunctivae, EOMI. PERRL. Airway patent.   CV: RRR, nl s1/s2.  Resp: CTAB, normal rate and effort. No wheezes, rhonchi, or crackles.   GI: Abdomen soft, NT, ND. No rebound, no guarding  Neuro: A&O x4, MAEx4. 5/5 str ext x 4. Sensation intact, symmetric throughout. No FND's. Gait intact. CN 2-12 intact.   MSK: FROM UE b/l. Pain w/ abduction of left arm. +ttp over the anterior lateral area of left shoulder, +ttp over AC joint left shoulder. No ecchymosis or swelling.   Skin: No rashes, skin intact and well perfused. cap refill <2sec  Vascular: Radial and ulnar pulses 2+ b/l. No LE edema. no calf ttp or swelling. Gen: No acute distress, non toxic  HEENT: Mucous membranes moist, pink conjunctivae, EOMI. PERRL. Airway patent.   CV: RRR, nl s1/s2.  Resp: CTAB, normal rate and effort. No wheezes, rhonchi, or crackles.   GI: Abdomen soft, NT, ND. No rebound, no guarding  Neuro: A&O x4, MAEx4. 5/5 str ext x 4. Sensation intact, symmetric throughout. No FND's. Gait intact. CN 2-12 intact.   MSK: FROM UE b/l. Pain w/ abduction of left arm. +ttp over the anterior lateral area of left shoulder, +ttp over AC joint left shoulder. No ttp over clavicle. No ecchymosis or swelling.   Skin: No rashes, skin intact and well perfused. cap refill <2sec  Vascular: Radial and ulnar pulses 2+ b/l. No LE edema. no calf ttp or swelling. Gen: No acute distress, non toxic  HEENT: Mucous membranes moist, pink conjunctivae, EOMI. PERRL. Airway patent.   Neck: FROM. No neck stiffness. Neck supple. No midline ttp.   CV: RRR, nl s1/s2.  Resp: CTAB, normal rate and effort. No wheezes, rhonchi, or crackles.   GI: Abdomen soft, NT, ND. No rebound, no guarding  Neuro: A&O x4, MAEx4. 5/5 str ext x 4. Sensation intact, symmetric throughout. No FND's. Gait intact. CN 2-12 intact.   MSK: No midline spinal ttp. FROM UE b/l. Pain w/ abduction of left arm. +ttp over the anterior lateral area of left shoulder, +ttp over AC joint left shoulder. No ttp over clavicle. No ecchymosis or swelling.   Skin: No rashes, skin intact and well perfused. cap refill <2sec  Vascular: Radial and ulnar pulses 2+ b/l. No LE edema. no calf ttp or swelling.

## 2022-10-11 NOTE — ED PROVIDER NOTE - NSFOLLOWUPINSTRUCTIONS_ED_ALL_ED_FT
- Please follow-up with your primary care doctor in the next 2-3 days.  Please call tomorrow for an appointment.  If you cannot follow-up with your primary care doctor please return to the ED for any urgent issues.  - Follow up with the orthopedist within 2-3 days.   - Take ibuprofen every 6 hours or tylenol every 4 hours as needed for pain. Take medication with food to prevent upset stomach.   - You were given a copy of the tests performed today.  Please bring the results with you and review them with your primary care doctor.  - If you have any worsening of symptoms or any other concerns please return to the ED immediately.  - Please continue taking your home medications as directed.

## 2022-10-11 NOTE — ED PROVIDER NOTE - CARE PROVIDER_API CALL
Zack Nicolas)  Orthopaedic Surgery  [address] [place] [address]  Phone: [phone]  Fax: [phone]  Follow Up Time:

## 2022-10-11 NOTE — ED PROVIDER NOTE - NS ED ROS FT
Gen: denies fever, chills  Skin: denies rashes  HEENT: denies visual changes  Respiratory: denies SOB  Cardiovascular: denies chest pain, palpitations  GI: denies abdominal pain, n/v  MSK: +left shoulder pain. denies back pain, neck pain  Neuro: denies headache, dizziness, LOC, weakness, numbness

## 2022-10-11 NOTE — ED ADULT TRIAGE NOTE - CHIEF COMPLAINT QUOTE
Pt with left shoulder pain x's 2 days. Pt unsure how he injured it but states he has limited ROM secondary to pain. Took Tylenol but states that it didn't help.

## 2022-10-11 NOTE — ED PROVIDER NOTE - CLINICAL SUMMARY MEDICAL DECISION MAKING FREE TEXT BOX
31 yo male with no pmhx presents with atraumatic left shoulder pain x2 days. Xrays performed 31 yo male with no pmhx presents with atraumatic left shoulder pain x2 days. Xrays performed- no visualized fx's. meds given. Strict return precautions explained. ortho f/u given.

## 2022-10-11 NOTE — ED PROVIDER NOTE - OBJECTIVE STATEMENT
31 yo male with no pmhx presents with left shoulder pain x2 days. Pt states 2 days ago, when he was waking up, noticed he had an achey left shoulder discomfort. States he doesn't normally sleep on his left side but not sure if he did. Denies any known trauma or falls to the shoulder, denies any heavy lifting. Pt states now the pain feels "burning and sharp." Reports he has taken tylenol for the pain, last dose was yesterday, denies alleviation of the pain. Reports he has been unable to move the shoulder much due to the pain. Denies experiencing this before. Denies fever, chills, body aches, dizziness, LOC, CP, SOB, abd pain, N/V, paresthesias in the extremities, rashes.

## 2022-10-11 NOTE — ED PROVIDER NOTE - NS ED ATTENDING STATEMENT MOD
I have seen and examined this patient and fully participated in the care of this patient as the teaching attending.  The service was shared with the ROLANDO.  I reviewed and verified the documentation and independently performed the documented:

## 2022-10-11 NOTE — ED PROVIDER NOTE - ATTENDING CONTRIBUTION TO CARE
31yo male with no PMH presenting with L shoulder pain x 2 days. +TTP over L acromion. XR neg for fracture. Tx pain dc home with ortho f/u. Nadeen Nieto DO

## 2022-10-11 NOTE — ED PROVIDER NOTE - PATIENT PORTAL LINK FT
You can access the FollowMyHealth Patient Portal offered by [facility] by registering at the following website: [email or web address]. By joining [employer]’s FollowMyHealth portal, you will also be able to view your health information using other applications (apps) compatible with our system.

## 2022-12-04 ENCOUNTER — EMERGENCY (EMERGENCY)
Facility: HOSPITAL | Age: 32
LOS: 1 days | Discharge: HOME | End: 2022-12-04
Attending: EMERGENCY MEDICINE
Payer: MEDICAID

## 2022-12-04 VITALS
OXYGEN SATURATION: 100 % | DIASTOLIC BLOOD PRESSURE: 86 MMHG | HEART RATE: 79 BPM | TEMPERATURE: 98 F | RESPIRATION RATE: 18 BRPM | WEIGHT: 270.07 LBS | HEIGHT: 71 IN | SYSTOLIC BLOOD PRESSURE: 129 MMHG

## 2022-12-04 DIAGNOSIS — Z90.49 ACQUIRED ABSENCE OF OTHER SPECIFIED PARTS OF DIGESTIVE TRACT: Chronic | ICD-10-CM

## 2022-12-04 PROCEDURE — 99283 EMERGENCY DEPT VISIT LOW MDM: CPT

## 2022-12-04 RX ORDER — IBUPROFEN 200 MG
1 TABLET ORAL
Qty: 15 | Refills: 0
Start: 2022-12-04 | End: 2022-12-08

## 2022-12-04 RX ORDER — IBUPROFEN 200 MG
600 TABLET ORAL ONCE
Refills: 0 | Status: COMPLETED | OUTPATIENT
Start: 2022-12-04 | End: 2022-12-04

## 2022-12-04 RX ORDER — CEPHALEXIN 500 MG
1 CAPSULE ORAL
Qty: 14 | Refills: 0
Start: 2022-12-04 | End: 2022-12-10

## 2022-12-04 RX ORDER — CEPHALEXIN 500 MG
500 CAPSULE ORAL ONCE
Refills: 0 | Status: COMPLETED | OUTPATIENT
Start: 2022-12-04 | End: 2022-12-04

## 2022-12-04 RX ADMIN — Medication 600 MILLIGRAM(S): at 10:05

## 2022-12-04 RX ADMIN — Medication 500 MILLIGRAM(S): at 10:05

## 2022-12-04 NOTE — ED PROVIDER NOTE - NS ED ROS FT
abscess   left shoulder pain   no chest pain   no sob    no fever   no chills   no numbness or tingling

## 2022-12-04 NOTE — ED PROVIDER NOTE - CLINICAL SUMMARY MEDICAL DECISION MAKING FREE TEXT BOX
33 yo male presenting with back abscess x 2 days, indurated without central fluctuance with surrounding erythema. also reporting atraumatic left shoulder pain worse with movements no associated chest pain or sob. will start on abx advised on fu with pcp and with ortho/informed of return precautions

## 2022-12-04 NOTE — ED PROVIDER NOTE - PHYSICAL EXAMINATION
Gen: Well appearing in NAD  Head: NC/AT  Neck: trachea midline  Resp:  No distress  Ext: no deformities. + ttp over anterior lateral shoulder no palpable effusion no redness. no palpable deformity 5/5 strength sensation intact. 2+ radial pulse.  FROM of shoulder joint space.   SPINE = 2x3 cm indurated area of erythema to posterior left side of thoracic back without fluctuance. no midline tenderness.  Neuro:  A&O appears non focal  Skin:  Warm and dry as visualized  Psych:  Normal affect and mood

## 2022-12-04 NOTE — ED PROVIDER NOTE - NSFOLLOWUPINSTRUCTIONS_ED_ALL_ED_FT
please return to the ED in 2-3 days for re-evaluation   please take antibiotic as directed   heat pack to the back     please ice the shoulder, alternate with tylenol and ibuprofen for pain   follow with ORTHOPEDICS       Abscess    An abscess is an infected area that contains a collection of pus and debris. It can occur in almost any part of the body and occurs when the tissue gets infection. Symptoms include a painful mass that is red, warm, tender that might break open and HAVE drainage. If your health care provider gave you antibiotics make sure to take the full course and do not stop even if feeling better.     SEEK IMMEDIATE MEDICAL CARE IF YOU HAVE ANY OF THE FOLLOWING SYMPTOMS: chills, fever, muscle aches, or red streaking from the area.    Strain    A strain is a stretch or tear in one of the muscles in your body. This is caused by an injury to the area such as a twisting mechanism. Symptoms include pain, swelling, or bruising. Rest that area over the next several days and slowly resume activity when tolerated. Ice can help with swelling and pain.     SEEK IMMEDIATE MEDICAL CARE IF YOU HAVE ANY OF THE FOLLOWING SYMPTOMS: worsening pain, inability to move that body part, numbness or tingling.

## 2022-12-04 NOTE — ED PROVIDER NOTE - PATIENT PORTAL LINK FT
You can access the FollowMyHealth Patient Portal offered by [facility] by registering at the following website: [email or web address]. By joining [email or web address]’s FollowMyHealth portal, you will also be able to view your health information using other applications (apps) compatible with our system.

## 2022-12-04 NOTE — ED PROVIDER NOTE - PROGRESS NOTE DETAILS
advised on ice to shoulder, alternating tylenol and ibu and fu with ortho referral   advised on heat pack to back, and fu in 2-3 days to see if ready for i/d   advised on starting abx due to erythema and return precautions

## 2022-12-04 NOTE — ED PROVIDER NOTE - CARE PROVIDER_API CALL
Zack Nicolas  Orthopaedic Surgery  [address] [place] [address]  Phone: [phone]  Fax: [phone]  Follow Up Time: Urgent

## 2022-12-04 NOTE — ED PROVIDER NOTE - OBJECTIVE STATEMENT
33 yo male no significant past medical hx presenting to the ED with "cyst" to the middle back region over the alst two days no associated fever or chills, no hx of abscess in the past. no IVDU.  pt also reporting pain to the left shoulder with movements. no associated chest pain or sob. reports he has pain to that shoulder in the past as well. works as a /  has not followed with a specialist for this pain. no new accidents or injuries. no medication given or taken for pain. no associated numbness or tingling.

## 2022-12-10 ENCOUNTER — EMERGENCY (EMERGENCY)
Facility: HOSPITAL | Age: 32
LOS: 1 days | Discharge: HOME | End: 2022-12-10
Attending: EMERGENCY MEDICINE
Payer: MEDICAID

## 2022-12-10 VITALS
OXYGEN SATURATION: 96 % | WEIGHT: 270.07 LBS | HEART RATE: 80 BPM | HEIGHT: 72 IN | RESPIRATION RATE: 18 BRPM | DIASTOLIC BLOOD PRESSURE: 91 MMHG | TEMPERATURE: 98 F | SYSTOLIC BLOOD PRESSURE: 136 MMHG

## 2022-12-10 DIAGNOSIS — Z90.49 ACQUIRED ABSENCE OF OTHER SPECIFIED PARTS OF DIGESTIVE TRACT: Chronic | ICD-10-CM

## 2022-12-10 PROCEDURE — 10060 I&D ABSCESS SIMPLE/SINGLE: CPT

## 2022-12-10 PROCEDURE — 10061 I&D ABSCESS COMP/MULTIPLE: CPT

## 2022-12-10 PROCEDURE — 99283 EMERGENCY DEPT VISIT LOW MDM: CPT | Mod: 25

## 2022-12-10 PROCEDURE — 99282 EMERGENCY DEPT VISIT SF MDM: CPT | Mod: 25

## 2022-12-11 NOTE — ED PROVIDER NOTE - ATTENDING APP SHARED VISIT CONTRIBUTION OF CARE
superficial abscess to his back  fluctuant Denies f/c/n/v/cp/sob/palpitations/ cough/rash/headache/dizziness/abd.pain/d/c/dysuria/hematuria    I+D dc with return precautions

## 2022-12-11 NOTE — ED PROVIDER NOTE - OBJECTIVE STATEMENT
33 yo male presenting to the ED for re-evaluation of abscess to his back, was seen less than 1 week ago no I/D at that time, reports increased swelling and drainage to the back no associated fever or chills.

## 2022-12-11 NOTE — ED PROVIDER NOTE - CLINICAL SUMMARY MEDICAL DECISION MAKING FREE TEXT BOX
33 yo male presenting to the ED for back abscess, i.d beside, substantiae drainage. advised on wound care and return precautions

## 2022-12-11 NOTE — ED PROVIDER NOTE - PHYSICAL EXAMINATION
Gen: Well appearing in NAD  Head: NC/AT  Neck: trachea midline  Resp:  No distress  Ext: no deformities  Neuro:  A&O appears non focal  Skin:  Warm and dry as visualized  Psych:  Normal affect and mood    5cm x 3 cm induration to left posterior back with central fluctuance no active drainage no surrounding erythema

## 2022-12-11 NOTE — ED PROVIDER NOTE - PATIENT PORTAL LINK FT
You can access the FollowMyHealth Patient Portal offered by [facility] by registering at the following website: [email or web address]. By joining [employer]’s FollowMyHealth portal, you will also be able to view your health information using other applications (apps) compatible with our system.

## 2022-12-11 NOTE — ED PROVIDER NOTE - NSFOLLOWUPINSTRUCTIONS_ED_ALL_ED_FT
Abscess    An abscess is an infected area that contains a collection of pus and debris. It can occur in almost any part of the body and occurs when the tissue gets infection. Symptoms include a painful mass that is red, warm, tender that might break open and HAVE drainage. If your health care provider gave you antibiotics make sure to take the full course and do not stop even if feeling better.     SEEK IMMEDIATE MEDICAL CARE IF YOU HAVE ANY OF THE FOLLOWING SYMPTOMS: chills, fever, muscle aches, or red streaking from the area.      daily dressing changes   new or worsening symptoms including redness, fever chills worsening pain or swelling return to the ED

## 2023-02-08 ENCOUNTER — EMERGENCY (EMERGENCY)
Facility: HOSPITAL | Age: 33
LOS: 1 days | Discharge: HOME | End: 2023-02-08
Attending: EMERGENCY MEDICINE
Payer: MEDICAID

## 2023-02-08 VITALS
DIASTOLIC BLOOD PRESSURE: 87 MMHG | OXYGEN SATURATION: 97 % | HEIGHT: 72 IN | HEART RATE: 97 BPM | RESPIRATION RATE: 20 BRPM | TEMPERATURE: 97 F | SYSTOLIC BLOOD PRESSURE: 129 MMHG

## 2023-02-08 DIAGNOSIS — Z90.49 ACQUIRED ABSENCE OF OTHER SPECIFIED PARTS OF DIGESTIVE TRACT: Chronic | ICD-10-CM

## 2023-02-08 PROCEDURE — 73030 X-RAY EXAM OF SHOULDER: CPT

## 2023-02-08 PROCEDURE — 73030 X-RAY EXAM OF SHOULDER: CPT | Mod: 26,LT

## 2023-02-08 PROCEDURE — 99284 EMERGENCY DEPT VISIT MOD MDM: CPT

## 2023-02-08 PROCEDURE — 99283 EMERGENCY DEPT VISIT LOW MDM: CPT

## 2023-02-08 PROCEDURE — 96372 THER/PROPH/DIAG INJ SC/IM: CPT

## 2023-02-08 RX ORDER — KETOROLAC TROMETHAMINE 30 MG/ML
30 SYRINGE (ML) INJECTION ONCE
Refills: 0 | Status: DISCONTINUED | OUTPATIENT
Start: 2023-02-08 | End: 2023-02-08

## 2023-02-08 RX ADMIN — Medication 30 MILLIGRAM(S): at 17:01

## 2023-02-08 NOTE — ED PROVIDER NOTE - PATIENT PORTAL LINK FT
You can access the FollowMyHealth Patient Portal offered by [facility] by registering at the following website: [email or web address]. By joining [employer]’s FollowMyHealth portal, you will also be able to view your health information using other applications (apps) compatible with our system.

## 2023-02-08 NOTE — ED PROVIDER NOTE - OBJECTIVE STATEMENT
32 male presents to the ED complaining of left shoulder pain x2 days.  Patient states that he "sat down hard and felt a crack/pop in his left shoulder." Pt denies numbness/tingling and has no other complaints at this time.

## 2023-02-08 NOTE — ED PROVIDER NOTE - CARE PROVIDER_API CALL
Zack Nicolas)  Orthopaedic Surgery  [address] [place] [address]  Phone: [phone]  Fax: [phone]  Follow Up Time:

## 2023-02-08 NOTE — ED PROVIDER NOTE - CLINICAL SUMMARY MEDICAL DECISION MAKING FREE TEXT BOX
32 male presents to the ED complaining left shoulder pain x2 days.  Patient reports moderate relief of presenting symptoms with medication given in the ED.  X-rays reviewed–shows no acute or chronic pathology.  Patient stable for discharge orthopedic follow-up.

## 2023-02-08 NOTE — ED PROVIDER NOTE - MUSCULOSKELETAL, MLM
Spine appears normal, range of motion is not limited. No cervical midline TTP. + Mild TTP of the left posterior shoulder. + FROM of the left shoulder, no deformity.

## 2023-02-08 NOTE — ED ADULT NURSE NOTE - OBJECTIVE STATEMENT
PT came in for shoulder pain the past couple of days. pt denies any injury or recent trauma. pt states pain is an 8/10 pt is alert and oriented x3 upon assessment pt is having decreased ROM. PT breathing even and unlabored.

## 2023-02-08 NOTE — ED PROVIDER NOTE - ATTENDING APP SHARED VISIT CONTRIBUTION OF CARE
The patient seen with PA    L shoulder pain    I, Trung oVgt, performed the initial face to face bedside interview with this patient regarding history of present illness, review of symptoms and relevant past medical, social and family history.  I completed an independent physical examination.  I was the initial provider who evaluated this patient. I have signed out the follow up of any pending tests (i.e. labs, radiological studies) to the ACP.  I have communicated the patient’s plan of care and disposition with the ACP.

## 2023-05-10 NOTE — ED ADULT NURSE NOTE - CAS TRG GENERAL AIRWAY, MLM
Hola is a healthy 1y5m here with family for evaluation of head injury.  About 3 hours ago, pt was running and ran into a table  NO LOC, cried and consolable acting normally since then.  No reproted pMhx, pSHx, meds, allergies Patent
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
## 2023-07-20 NOTE — ED PROCEDURE NOTE - CPROC ED ANATOMIC LOCATION1
[FreeTextEntry1] : He reports significant improvement after the subacromial injection 4 months ago.  There is some residual discomfort but the night symptoms have resolved.  His exam demonstrates slight worsening forward flexion and internal rotation which suggest that this could be an evolving case of adhesive capsulitis.  I would hold off on another cortisone injection and instead would proceed with physical therapy which he never did since the last visit.  If there are still symptoms despite physical therapy he will contact me for follow-up visit. knee

## 2023-08-01 ENCOUNTER — INPATIENT (INPATIENT)
Facility: HOSPITAL | Age: 33
LOS: 4 days | Discharge: HOME | DRG: 558 | End: 2023-08-06
Attending: STUDENT IN AN ORGANIZED HEALTH CARE EDUCATION/TRAINING PROGRAM | Admitting: HOSPITALIST
Payer: MEDICAID

## 2023-08-01 VITALS
SYSTOLIC BLOOD PRESSURE: 139 MMHG | DIASTOLIC BLOOD PRESSURE: 92 MMHG | OXYGEN SATURATION: 97 % | TEMPERATURE: 98 F | RESPIRATION RATE: 20 BRPM | HEART RATE: 77 BPM | WEIGHT: 298.51 LBS

## 2023-08-01 DIAGNOSIS — Z90.49 ACQUIRED ABSENCE OF OTHER SPECIFIED PARTS OF DIGESTIVE TRACT: Chronic | ICD-10-CM

## 2023-08-01 PROCEDURE — 99285 EMERGENCY DEPT VISIT HI MDM: CPT

## 2023-08-01 NOTE — ED ADULT TRIAGE NOTE - CHIEF COMPLAINT QUOTE
Im having arm & shoulder pain from working out at the gym since yesterday & I noticed nasal bleeding for 2 days On/Off

## 2023-08-01 NOTE — ED ADULT TRIAGE NOTE - NS ED TRIAGE AVPU SCALE
None known Alert-The patient is alert, awake and responds to voice. The patient is oriented to time, place, and person. The triage nurse is able to obtain subjective information.

## 2023-08-02 DIAGNOSIS — M62.82 RHABDOMYOLYSIS: ICD-10-CM

## 2023-08-02 LAB
A1C WITH ESTIMATED AVERAGE GLUCOSE RESULT: 5.9 % — HIGH (ref 4–5.6)
ALBUMIN SERPL ELPH-MCNC: 3.9 G/DL — SIGNIFICANT CHANGE UP (ref 3.3–5.2)
ALBUMIN SERPL ELPH-MCNC: 4.2 G/DL — SIGNIFICANT CHANGE UP (ref 3.3–5.2)
ALP SERPL-CCNC: 76 U/L — SIGNIFICANT CHANGE UP (ref 40–120)
ALP SERPL-CCNC: 81 U/L — SIGNIFICANT CHANGE UP (ref 40–120)
ALT FLD-CCNC: 116 U/L — HIGH
ALT FLD-CCNC: 96 U/L — HIGH
ANION GAP SERPL CALC-SCNC: 13 MMOL/L — SIGNIFICANT CHANGE UP (ref 5–17)
ANION GAP SERPL CALC-SCNC: 16 MMOL/L — SIGNIFICANT CHANGE UP (ref 5–17)
APPEARANCE UR: ABNORMAL
AST SERPL-CCNC: 208 U/L — HIGH
AST SERPL-CCNC: 336 U/L — HIGH
BACTERIA # UR AUTO: ABNORMAL
BASOPHILS # BLD AUTO: 0.03 K/UL — SIGNIFICANT CHANGE UP (ref 0–0.2)
BASOPHILS NFR BLD AUTO: 0.4 % — SIGNIFICANT CHANGE UP (ref 0–2)
BILIRUB SERPL-MCNC: 0.7 MG/DL — SIGNIFICANT CHANGE UP (ref 0.4–2)
BILIRUB SERPL-MCNC: 0.7 MG/DL — SIGNIFICANT CHANGE UP (ref 0.4–2)
BILIRUB UR-MCNC: NEGATIVE — SIGNIFICANT CHANGE UP
BUN SERPL-MCNC: 12.8 MG/DL — SIGNIFICANT CHANGE UP (ref 8–20)
BUN SERPL-MCNC: 16 MG/DL — SIGNIFICANT CHANGE UP (ref 8–20)
CALCIUM SERPL-MCNC: 8.5 MG/DL — SIGNIFICANT CHANGE UP (ref 8.4–10.5)
CALCIUM SERPL-MCNC: 9.1 MG/DL — SIGNIFICANT CHANGE UP (ref 8.4–10.5)
CHLORIDE SERPL-SCNC: 100 MMOL/L — SIGNIFICANT CHANGE UP (ref 96–108)
CHLORIDE SERPL-SCNC: 104 MMOL/L — SIGNIFICANT CHANGE UP (ref 96–108)
CK MB CFR SERPL CALC: 31.3 NG/ML — HIGH (ref 0–6.7)
CK MB CFR SERPL CALC: 43.7 NG/ML — HIGH (ref 0–6.7)
CK SERPL-CCNC: CRITICAL HIGH U/L (ref 30–200)
CK SERPL-CCNC: CRITICAL HIGH U/L (ref 30–200)
CO2 SERPL-SCNC: 20 MMOL/L — LOW (ref 22–29)
CO2 SERPL-SCNC: 26 MMOL/L — SIGNIFICANT CHANGE UP (ref 22–29)
COLOR SPEC: YELLOW — SIGNIFICANT CHANGE UP
COMMENT - URINE: SIGNIFICANT CHANGE UP
CREAT SERPL-MCNC: 1.04 MG/DL — SIGNIFICANT CHANGE UP (ref 0.5–1.3)
CREAT SERPL-MCNC: 1.37 MG/DL — HIGH (ref 0.5–1.3)
DIFF PNL FLD: ABNORMAL
EGFR: 70 ML/MIN/1.73M2 — SIGNIFICANT CHANGE UP
EGFR: 97 ML/MIN/1.73M2 — SIGNIFICANT CHANGE UP
EOSINOPHIL # BLD AUTO: 0.1 K/UL — SIGNIFICANT CHANGE UP (ref 0–0.5)
EOSINOPHIL NFR BLD AUTO: 1.2 % — SIGNIFICANT CHANGE UP (ref 0–6)
EPI CELLS # UR: SIGNIFICANT CHANGE UP
ESTIMATED AVERAGE GLUCOSE: 123 MG/DL — HIGH (ref 68–114)
GLUCOSE SERPL-MCNC: 89 MG/DL — SIGNIFICANT CHANGE UP (ref 70–99)
GLUCOSE SERPL-MCNC: 95 MG/DL — SIGNIFICANT CHANGE UP (ref 70–99)
GLUCOSE UR QL: NEGATIVE MG/DL — SIGNIFICANT CHANGE UP
HCT VFR BLD CALC: 48.1 % — SIGNIFICANT CHANGE UP (ref 39–50)
HGB BLD-MCNC: 16.4 G/DL — SIGNIFICANT CHANGE UP (ref 13–17)
IMM GRANULOCYTES NFR BLD AUTO: 0.8 % — SIGNIFICANT CHANGE UP (ref 0–0.9)
INR BLD: 1 RATIO — SIGNIFICANT CHANGE UP (ref 0.85–1.18)
KETONES UR-MCNC: ABNORMAL
LEUKOCYTE ESTERASE UR-ACNC: ABNORMAL
LYMPHOCYTES # BLD AUTO: 2.45 K/UL — SIGNIFICANT CHANGE UP (ref 1–3.3)
LYMPHOCYTES # BLD AUTO: 28.7 % — SIGNIFICANT CHANGE UP (ref 13–44)
MCHC RBC-ENTMCNC: 29.2 PG — SIGNIFICANT CHANGE UP (ref 27–34)
MCHC RBC-ENTMCNC: 34.1 GM/DL — SIGNIFICANT CHANGE UP (ref 32–36)
MCV RBC AUTO: 85.6 FL — SIGNIFICANT CHANGE UP (ref 80–100)
MONOCYTES # BLD AUTO: 0.55 K/UL — SIGNIFICANT CHANGE UP (ref 0–0.9)
MONOCYTES NFR BLD AUTO: 6.4 % — SIGNIFICANT CHANGE UP (ref 2–14)
NEUTROPHILS # BLD AUTO: 5.34 K/UL — SIGNIFICANT CHANGE UP (ref 1.8–7.4)
NEUTROPHILS NFR BLD AUTO: 62.5 % — SIGNIFICANT CHANGE UP (ref 43–77)
NITRITE UR-MCNC: NEGATIVE — SIGNIFICANT CHANGE UP
PH UR: 6 — SIGNIFICANT CHANGE UP (ref 5–8)
PLATELET # BLD AUTO: 199 K/UL — SIGNIFICANT CHANGE UP (ref 150–400)
POTASSIUM SERPL-MCNC: 4.3 MMOL/L — SIGNIFICANT CHANGE UP (ref 3.5–5.3)
POTASSIUM SERPL-MCNC: 4.3 MMOL/L — SIGNIFICANT CHANGE UP (ref 3.5–5.3)
POTASSIUM SERPL-SCNC: 4.3 MMOL/L — SIGNIFICANT CHANGE UP (ref 3.5–5.3)
POTASSIUM SERPL-SCNC: 4.3 MMOL/L — SIGNIFICANT CHANGE UP (ref 3.5–5.3)
PROT SERPL-MCNC: 6.7 G/DL — SIGNIFICANT CHANGE UP (ref 6.6–8.7)
PROT SERPL-MCNC: 7.6 G/DL — SIGNIFICANT CHANGE UP (ref 6.6–8.7)
PROT UR-MCNC: 30 MG/DL
PROTHROM AB SERPL-ACNC: 11.1 SEC — SIGNIFICANT CHANGE UP (ref 9.5–13)
RBC # BLD: 5.62 M/UL — SIGNIFICANT CHANGE UP (ref 4.2–5.8)
RBC # FLD: 12.6 % — SIGNIFICANT CHANGE UP (ref 10.3–14.5)
RBC CASTS # UR COMP ASSIST: ABNORMAL /HPF (ref 0–4)
SODIUM SERPL-SCNC: 139 MMOL/L — SIGNIFICANT CHANGE UP (ref 135–145)
SODIUM SERPL-SCNC: 140 MMOL/L — SIGNIFICANT CHANGE UP (ref 135–145)
SP GR SPEC: 1.02 — SIGNIFICANT CHANGE UP (ref 1.01–1.02)
UROBILINOGEN FLD QL: NEGATIVE MG/DL — SIGNIFICANT CHANGE UP
WBC # BLD: 8.54 K/UL — SIGNIFICANT CHANGE UP (ref 3.8–10.5)
WBC # FLD AUTO: 8.54 K/UL — SIGNIFICANT CHANGE UP (ref 3.8–10.5)
WBC UR QL: >50 /HPF (ref 0–5)

## 2023-08-02 PROCEDURE — 99223 1ST HOSP IP/OBS HIGH 75: CPT

## 2023-08-02 RX ORDER — KETOROLAC TROMETHAMINE 30 MG/ML
15 SYRINGE (ML) INJECTION ONCE
Refills: 0 | Status: DISCONTINUED | OUTPATIENT
Start: 2023-08-02 | End: 2023-08-02

## 2023-08-02 RX ORDER — ACETAMINOPHEN 500 MG
650 TABLET ORAL EVERY 6 HOURS
Refills: 0 | Status: DISCONTINUED | OUTPATIENT
Start: 2023-08-02 | End: 2023-08-02

## 2023-08-02 RX ORDER — ONDANSETRON 8 MG/1
4 TABLET, FILM COATED ORAL ONCE
Refills: 0 | Status: DISCONTINUED | OUTPATIENT
Start: 2023-08-02 | End: 2023-08-06

## 2023-08-02 RX ORDER — HEPARIN SODIUM 5000 [USP'U]/ML
5000 INJECTION INTRAVENOUS; SUBCUTANEOUS EVERY 8 HOURS
Refills: 0 | Status: DISCONTINUED | OUTPATIENT
Start: 2023-08-02 | End: 2023-08-02

## 2023-08-02 RX ORDER — SODIUM CHLORIDE 9 MG/ML
2000 INJECTION INTRAMUSCULAR; INTRAVENOUS; SUBCUTANEOUS ONCE
Refills: 0 | Status: COMPLETED | OUTPATIENT
Start: 2023-08-02 | End: 2023-08-02

## 2023-08-02 RX ORDER — SODIUM CHLORIDE 9 MG/ML
1000 INJECTION INTRAMUSCULAR; INTRAVENOUS; SUBCUTANEOUS ONCE
Refills: 0 | Status: COMPLETED | OUTPATIENT
Start: 2023-08-02 | End: 2023-08-02

## 2023-08-02 RX ORDER — CEFTRIAXONE 500 MG/1
1000 INJECTION, POWDER, FOR SOLUTION INTRAMUSCULAR; INTRAVENOUS EVERY 24 HOURS
Refills: 0 | Status: COMPLETED | OUTPATIENT
Start: 2023-08-02 | End: 2023-08-04

## 2023-08-02 RX ORDER — OXYMETAZOLINE HYDROCHLORIDE 0.5 MG/ML
2 SPRAY NASAL ONCE
Refills: 0 | Status: COMPLETED | OUTPATIENT
Start: 2023-08-02 | End: 2023-08-02

## 2023-08-02 RX ORDER — SODIUM BICARBONATE 1 MEQ/ML
0.06 SYRINGE (ML) INTRAVENOUS
Qty: 75 | Refills: 0 | Status: DISCONTINUED | OUTPATIENT
Start: 2023-08-02 | End: 2023-08-03

## 2023-08-02 RX ORDER — CEFTRIAXONE 500 MG/1
1000 INJECTION, POWDER, FOR SOLUTION INTRAMUSCULAR; INTRAVENOUS EVERY 24 HOURS
Refills: 0 | Status: DISCONTINUED | OUTPATIENT
Start: 2023-08-02 | End: 2023-08-02

## 2023-08-02 RX ADMIN — Medication 15 MILLIGRAM(S): at 23:14

## 2023-08-02 RX ADMIN — Medication 100 MEQ/KG/HR: at 10:41

## 2023-08-02 RX ADMIN — CEFTRIAXONE 1000 MILLIGRAM(S): 500 INJECTION, POWDER, FOR SOLUTION INTRAMUSCULAR; INTRAVENOUS at 11:18

## 2023-08-02 RX ADMIN — SODIUM CHLORIDE 1000 MILLILITER(S): 9 INJECTION INTRAMUSCULAR; INTRAVENOUS; SUBCUTANEOUS at 02:16

## 2023-08-02 RX ADMIN — Medication 15 MILLIGRAM(S): at 03:40

## 2023-08-02 RX ADMIN — SODIUM CHLORIDE 2000 MILLILITER(S): 9 INJECTION INTRAMUSCULAR; INTRAVENOUS; SUBCUTANEOUS at 03:42

## 2023-08-02 RX ADMIN — OXYMETAZOLINE HYDROCHLORIDE 2 SPRAY(S): 0.5 SPRAY NASAL at 02:08

## 2023-08-02 RX ADMIN — Medication 15 MILLIGRAM(S): at 02:07

## 2023-08-02 NOTE — CHART NOTE - NSCHARTNOTEFT_GEN_A_CORE
Patient complaining of pain and soreness in his arm and shoulders. Is unable to extend elbows due to soreness. Given one time dose of IV Toradol 15 mg.

## 2023-08-02 NOTE — ED PROVIDER NOTE - ADMIT DISPOSITION PRESENT ON ADMISSION SEPSIS
-Rec'd completed form from pt along with add'l documentation from [employer]  -Faxed all information to [facility] at [phone]  -Pt notified via phone  -Encourage to call back if information not rec'd so that it can be re-faxed    -Original sent to scanning  -Copy retained in clinic until scanning confirmed     No

## 2023-08-02 NOTE — H&P ADULT - HISTORY OF PRESENT ILLNESS
32-year-old male with no significant medical history presents to ED complaining of upper chest and arm pain after working out for the first time in a long time.  Patient did upper body work out yesterday did not take any supplements or preworkout's.  patient states that he has not been working ut for few moths but then did aggressive work out and heavy weight lifting yesterday. Notes that today he began to have extreme soreness of the upper extremities.  Notes he cannot straighten out his arms without being in pain.  Also admits to a nosebleed today from the right nare that has since subsided.  No trauma to the nose.  No blood thinners. no epistaxis since after arrival to hospital.   in ed noted with elevated cpk and balaji. also noted UA positive, elevated lfts.

## 2023-08-02 NOTE — H&P ADULT - ASSESSMENT
32-year-old male with no significant medical history presents to ED complaining of upper chest and arm pain after working out for the first time in a long time.  Patient did upper body work out yesterday did not take any supplements or preworkout's.  patient states that he has not been working ut for few moths but then did aggressive work out and heavy weight lifting yesterday. Notes that today he began to have extreme soreness of the upper extremities.  Notes he cannot straighten out his arms without being in pain.  Also admits to a nosebleed today from the right nare that has since subsided.  No trauma to the nose.  No blood thinners. no epistaxis since after arrival to hospital.   in ed noted with elevated cpk and balaji. also noted UA positive, elevated lfts.     > rhabdo     > balaji  > uti    32-year-old male with no significant medical history/ not on regular home meds.  presents to ED complaining of upper chest and arm pain after working out for the first time in a long time.  Patient did upper body work out yesterday did not take any supplements or preworkout's.  patient states that he has not been working ut for few moths but then did aggressive work out and heavy weight lifting yesterday. Notes that today he began to have extreme soreness of the upper extremities.  Notes he cannot straighten out his arms without being in pain.  Also admits to a nosebleed today from the right nare that has since subsided.  No trauma to the nose.  No blood thinners. no epistaxis since after arrival to hospital.   in ed noted with elevated cpk and balaji. also noted UA positive, elevated lfts.     > b/l arm pain/ likely due to rhabdomyolysis / likely due to extensive work out   - no focal swelling/ or focal point tenderness noted. generalized tenderness in arms b/l.  no concern for nec fasc at this time.   - admit to medicine   - ivf with bicarb   - monitor cpk   - tylenol prn for pain    > balaji/ likely due to rhabdo   - ivf with bicarb   - check renal us   - montior i/os     > uti   - wbcs nl but ua noted to be grossly positive   - check urine cxs   - will start on iv abxs     > epistaxis episode   - unlcear etiology   - no new episodes while in ed   - plts nl , not on any anti plts or AC.   - check coags   - montior     > elevated lfts   - unclear etiology   - ? metabolic syndrome   - check liver us , lipid pannel , a1c   - ivf     > obesity   - weigh loss advised     > dvt ppx : scds for dvt ppx due episode of epistaxis   32-year-old male with no significant medical history/ not on regular home meds.  presents to ED complaining of upper chest and arm pain after working out for the first time in a long time.  Patient did upper body work out yesterday did not take any supplements or preworkout's.  patient states that he has not been working ut for few moths but then did aggressive work out and heavy weight lifting yesterday. Notes that today he began to have extreme soreness of the upper extremities.  Notes he cannot straighten out his arms without being in pain.  Also admits to a nosebleed today from the right nare that has since subsided.  No trauma to the nose.  No blood thinners. no epistaxis since after arrival to hospital.   in ed noted with elevated cpk and balaji. also noted UA positive, elevated lfts.     > b/l arm pain/ likely due to rhabdomyolysis / likely due to extensive work out   - no focal swelling/ or focal point tenderness noted. generalized tenderness in arms b/l.  no concern for nec fasc at this time.   - admit to medicine   - ivf with bicarb   - monitor cpk     > balaji/ likely due to rhabdo   - ivf with bicarb   - check renal us   - montior i/os     > uti   - wbcs nl but ua noted to be grossly positive   - check urine cxs   - will start on iv abxs     > epistaxis episode   - unlcear etiology   - no new episodes while in ed   - plts nl , not on any anti plts or AC.   - check coags   - montior     > elevated lfts   - unclear etiology   - ? metabolic syndrome   - check liver us , lipid pannel , a1c   - ivf     > obesity   - weigh loss advised     > dvt ppx : scds for dvt ppx due episode of epistaxis

## 2023-08-02 NOTE — ED ADULT NURSE NOTE - NSFALLUNIVINTERV_ED_ALL_ED
Bed/Stretcher in lowest position, wheels locked, appropriate side rails in place/Call bell, personal items and telephone in reach/Instruct patient to call for assistance before getting out of bed/chair/stretcher/Non-slip footwear applied when patient is off stretcher/[place] to call system/Physically safe environment - no spills, clutter or unnecessary equipment/Purposeful proactive rounding/Room/bathroom lighting operational, light cord in reach

## 2023-08-02 NOTE — H&P ADULT - NSHPPHYSICALEXAM_GEN_ALL_CORE
Vital Signs Last 24 Hrs  T(C): 36.6 (02 Aug 2023 06:50), Max: 36.7 (01 Aug 2023 23:19)  T(F): 97.8 (02 Aug 2023 06:50), Max: 98.1 (01 Aug 2023 23:19)  HR: 82 (02 Aug 2023 06:50) (77 - 82)  BP: 132/88 (02 Aug 2023 06:50) (132/88 - 139/92)  BP(mean): --  RR: 17 (02 Aug 2023 06:50) (17 - 20)  SpO2: 99% (02 Aug 2023 06:50) (97% - 99%)    Parameters below as of 02 Aug 2023 06:50  Patient On (Oxygen Delivery Method): room air    PHYSICAL EXAM:   General-alert and oriented to person place and time,  NAD  HEENT-normocephalic, atraumatic, moist oral mucosa, no blood noted in nares   Neck- supple, trach midline, No JVD, no LAD  Cardio-s1,s2 present, regular rate and rhythm  Resp- talks in full sentences, symmetrical chest rise, CTA bilat, no evidence of wheezes, rhonchi noted  Abdomen- bowel sounds presnt in all 4 quadrants, soft, NT/ND, no guarding, no rebound tenderness ,No CVA tenderness  MSK- moves lower ext without any issues ,  b/l arms  tender to touch bilaterally. soft , pulses intact. no numbness or tingling.  FROM   Back- nt to palp of cervical, thoracic, lumbar spine, nt to palp of paraspinal m.,  Neuro- no focal deficits, sensation intact Vital Signs Last 24 Hrs  T(C): 36.6 (02 Aug 2023 06:50), Max: 36.7 (01 Aug 2023 23:19)  T(F): 97.8 (02 Aug 2023 06:50), Max: 98.1 (01 Aug 2023 23:19)  HR: 82 (02 Aug 2023 06:50) (77 - 82)  BP: 132/88 (02 Aug 2023 06:50) (132/88 - 139/92)  BP(mean): --  RR: 17 (02 Aug 2023 06:50) (17 - 20)  SpO2: 99% (02 Aug 2023 06:50) (97% - 99%)    Parameters below as of 02 Aug 2023 06:50  Patient On (Oxygen Delivery Method): room air    PHYSICAL EXAM:   General-alert and oriented to person place and time,  NAD  HEENT-normocephalic, atraumatic, moist oral mucosa, no blood noted in nares   Neck- supple, trach midline, No JVD, no LAD  Cardio-s1,s2 present, regular rate and rhythm  Resp- talks in full sentences, symmetrical chest rise, CTA bilat, no evidence of wheezes, rhonchi noted  Abdomen- bowel sounds presnt in all 4 quadrants, soft, NT/ND, no guarding, no rebound tenderness ,No CVA tenderness  MSK- moves lower ext without any issues ,  b/l arms  tender to touch bilaterally. soft , pulses intact. no numbness or tingling.  FROM . radial/ brachial pulses intact   Back- nt to palp of cervical, thoracic, lumbar spine, nt to palp of paraspinal m.,  Neuro- no focal deficits, sensation intact

## 2023-08-02 NOTE — ED ADULT NURSE NOTE - OBJECTIVE STATEMENT
Pt. c/o bilateral arm/shoulder pain from working out too hard at the gym 2 days ago.  Pt. able to SHEA, difficulty straightening his arms.

## 2023-08-02 NOTE — PATIENT PROFILE ADULT - HEALTH LITERACY
Message   Recorded as Task   Date: 06/16/2016 02:41 PM, Created By: Alejandro Bose   Task Name: Miscellaneous   Assigned To: NorbertBlanka   Regarding Patient: Manual Minder, Status: Active   Bharati Cody - 16 Jun 2016 2:41 PM     TASK CREATED  PA for Flector patch has been approved and is valid until 6/16/2017  Active Problems    1  Abnormal findings on diagnostic imaging of breast (793 89) (R92 8)   2  Allergic rhinitis (477 9) (J30 9)   3  Analgesic use (V58 69) (Z79 899)   4  Anxiety (300 00) (F41 9)   5  Encounter for routine gynecological examination (V72 31) (Z01 419)   6  Encounter for screening mammogram for malignant neoplasm of breast (V76 12)   (Z12 31)   7  Esophageal reflux (530 81) (K21 9)   8  Hiatal hernia (553 3) (K44 9)   9  Hyperlipidemia (272 4) (E78 5)   10  Laboratory examination ordered as part of a routine general medical examination    (V72 62) (Z00 00)   11  Leiomyoma of uterus (218 9) (D25 9)   12  Lower back pain (724 2) (M54 5)   13  Lumbar radiculopathy (724 4) (M54 16)   14  Need for immunization against influenza (V04 81) (Z23)   15  Pain syndrome, chronic (338 4) (G89 4)   16  Pulmonary Valve Regurgitation (424 3)   17  Routine Gynecological Exam With Cervical Pap Smear (V72 31)   18  Sacroiliitis (720 2) (M46 1)    Current Meds   1  Escitalopram Oxalate 10 MG Oral Tablet; Take 1 tablet daily; Therapy: 21SNX4109 to (Evaluate:13Jun2016)  Requested for: 11PFI0858; Last   Rx:[id number] Ordered   2  Flector 1 3 % Transdermal Patch; APPLY PATCH TO AFFECTED AREA TWICE DAILY; Therapy: 37LBU0714 to (Evaluate:88Dcn5539)  Requested for: 34XSB0877; Last   Rx:16Jun2016 Ordered   3  Fluticasone Propionate 50 MCG/ACT Nasal Suspension; USE 1 SPRAY IN EACH   NOSTRIL TWICE DAILY; Therapy: 05XTS4592 to (Evaluate:07Jun2015)  Requested for: 60PNT1919; Last   Rx:[id number] Ordered   4  Metamucil Smooth Texture 28 % Oral Packet; USE AS DIRECTED;    Therapy: (Recorded:21Oct2013) to Recorded   5  Nucynta ER 50 MG Oral Tablet Extended Release 12 Hour; take 1 tablet every twelve   hours; Therapy: 95Nmw8465 to (Evaluate:44Gmk2755); Last Rx:16Jun2016 Ordered   6  Omeprazole 20 MG Oral Capsule Delayed Release; take 1 capsule by mouth once daily; Therapy: 79QLH3303 to (Evaluate:13Jun2016)  Requested for: 67BZM7030; Last   Rx:[id number] Ordered   7  Simvastatin 20 MG Oral Tablet; take 1 tablet by mouth once daily; Therapy: 19UQC6202 to (Evaluate:13Jun2016)  Requested for: 83ZWZ6431; Last   Rx:[id number] Ordered    Allergies    1   Sulfa Drugs    Signatures   Electronically signed by : Sidney Retana, ; Jun 16 2016  2:42PM EST                       (Author) no

## 2023-08-02 NOTE — ED PROVIDER NOTE - CLINICAL SUMMARY MEDICAL DECISION MAKING FREE TEXT BOX
32-year-old male with no significant medical history presents to ED complaining of upper chest and arm pain after working out for the first time in a long time.  no active bleeding, given afrin, liekly just soreness after working out of upper extremities and chest 32-year-old male with no significant medical history presents to ED complaining of upper chest and arm pain after working out for the first time in a long time.  no active bleeding, given afrin, Pt with cpk of 14 given 3 L NS and cpk went up to 17k, wbc of >50 with mucous, no symptoms of uti, cultures pending, Discussed need to admit with patient & discussed risk and benefits.  Patient agreed to admission.  Discussed case w/ admitting medicine doctor - agreed to admit to their service.

## 2023-08-02 NOTE — H&P ADULT - NSHPLABSRESULTS_GEN_ALL_CORE
16.4   8.54  )-----------( 199      ( 02 Aug 2023 02:30 )             48.1   08-02    139  |  100  |  16.0  ----------------------------<  95  4.3   |  26.0  |  1.37<H>    Ca    9.1      02 Aug 2023 02:30    TPro  7.6  /  Alb  4.2  /  TBili  0.7  /  DBili  x   /  AST  208<H>  /  ALT  96<H>  /  AlkPhos  81  08-02

## 2023-08-02 NOTE — ED PROVIDER NOTE - OBJECTIVE STATEMENT
32-year-old male with no significant medical history presents to ED complaining of upper chest and arm pain after working out for the first time in a long time.  Patient notes he was at upper body yesterday did not take any supplements or preworkout's.  Notes that today he began to have extreme soreness of the upper extremities.  Notes he cannot straighten out his arms without being in pain.  Also admits to a nosebleed today from the right nare that has since subsided.  No trauma to the nose.  No blood thinners.

## 2023-08-02 NOTE — ED PROVIDER NOTE - PHYSICAL EXAMINATION
General-alert and oriented to person place and time, nontoxic appearing, pleasant cooperative, NAD  HEENT-normocephalic, atraumatic, NT to palp, EOMI, PERRLA, no conjunctival injections, nares patent, no lesions, pinna nt to palp, tympanic membrane intact bilaterally, nonbulging TM, no erythema noted, +light reflex, moist oral mucosa, tongue nonenlarged, uvula midline, tonsils nonenlarged, no exudates or erythema noted  Neck- supple, trach midline, No JVD, no LAD  Chest- Nt to palp, no reproducible pain  Cardio-s1,s2 present, regular rate and rhythm  Resp- talks in full sentences, symmetrical chest rise, CTA bilat, no evidence of wheezes, rhonchi noted  Abdomen- bowel sounds presnt in all 4 quadrants, soft, NT/ND, no guarding, no rebound tenderness  MSK- moves all extremities, able to ambulate without issues, tender to chest and arms bilaterally, FROM   Back- nt to palp of cervical, thoracic, lumbar spine, nt to palp of paraspinal m., No CVA tenderness  Neuro- no focal deficits, sensation intact

## 2023-08-03 LAB
ALBUMIN SERPL ELPH-MCNC: 3.7 G/DL — SIGNIFICANT CHANGE UP (ref 3.3–5.2)
ALP SERPL-CCNC: 71 U/L — SIGNIFICANT CHANGE UP (ref 40–120)
ALT FLD-CCNC: 147 U/L — HIGH
ANION GAP SERPL CALC-SCNC: 14 MMOL/L — SIGNIFICANT CHANGE UP (ref 5–17)
APTT BLD: 30.9 SEC — SIGNIFICANT CHANGE UP (ref 24.5–35.6)
AST SERPL-CCNC: 387 U/L — HIGH
BILIRUB SERPL-MCNC: 0.6 MG/DL — SIGNIFICANT CHANGE UP (ref 0.4–2)
BUN SERPL-MCNC: 14.7 MG/DL — SIGNIFICANT CHANGE UP (ref 8–20)
CALCIUM SERPL-MCNC: 8.6 MG/DL — SIGNIFICANT CHANGE UP (ref 8.4–10.5)
CHLORIDE SERPL-SCNC: 103 MMOL/L — SIGNIFICANT CHANGE UP (ref 96–108)
CHOLEST SERPL-MCNC: 143 MG/DL — SIGNIFICANT CHANGE UP
CK MB CFR SERPL CALC: 44.9 NG/ML — HIGH (ref 0–6.7)
CK SERPL-CCNC: CRITICAL HIGH U/L (ref 30–200)
CO2 SERPL-SCNC: 21 MMOL/L — LOW (ref 22–29)
CREAT SERPL-MCNC: 1.14 MG/DL — SIGNIFICANT CHANGE UP (ref 0.5–1.3)
CULTURE RESULTS: SIGNIFICANT CHANGE UP
EGFR: 87 ML/MIN/1.73M2 — SIGNIFICANT CHANGE UP
GLUCOSE SERPL-MCNC: 100 MG/DL — HIGH (ref 70–99)
HCT VFR BLD CALC: 45.4 % — SIGNIFICANT CHANGE UP (ref 39–50)
HDLC SERPL-MCNC: 24 MG/DL — LOW
HGB BLD-MCNC: 15.8 G/DL — SIGNIFICANT CHANGE UP (ref 13–17)
LIPID PNL WITH DIRECT LDL SERPL: 82 MG/DL — SIGNIFICANT CHANGE UP
MAGNESIUM SERPL-MCNC: 2 MG/DL — SIGNIFICANT CHANGE UP (ref 1.6–2.6)
MCHC RBC-ENTMCNC: 29.8 PG — SIGNIFICANT CHANGE UP (ref 27–34)
MCHC RBC-ENTMCNC: 34.8 GM/DL — SIGNIFICANT CHANGE UP (ref 32–36)
MCV RBC AUTO: 85.5 FL — SIGNIFICANT CHANGE UP (ref 80–100)
NON HDL CHOLESTEROL: 119 MG/DL — SIGNIFICANT CHANGE UP
PLATELET # BLD AUTO: 173 K/UL — SIGNIFICANT CHANGE UP (ref 150–400)
POTASSIUM SERPL-MCNC: 4.1 MMOL/L — SIGNIFICANT CHANGE UP (ref 3.5–5.3)
POTASSIUM SERPL-SCNC: 4.1 MMOL/L — SIGNIFICANT CHANGE UP (ref 3.5–5.3)
PROT SERPL-MCNC: 6.4 G/DL — LOW (ref 6.6–8.7)
RBC # BLD: 5.31 M/UL — SIGNIFICANT CHANGE UP (ref 4.2–5.8)
RBC # FLD: 12.5 % — SIGNIFICANT CHANGE UP (ref 10.3–14.5)
SODIUM SERPL-SCNC: 138 MMOL/L — SIGNIFICANT CHANGE UP (ref 135–145)
SPECIMEN SOURCE: SIGNIFICANT CHANGE UP
TRIGL SERPL-MCNC: 185 MG/DL — HIGH
WBC # BLD: 5.52 K/UL — SIGNIFICANT CHANGE UP (ref 3.8–10.5)
WBC # FLD AUTO: 5.52 K/UL — SIGNIFICANT CHANGE UP (ref 3.8–10.5)

## 2023-08-03 PROCEDURE — 99233 SBSQ HOSP IP/OBS HIGH 50: CPT

## 2023-08-03 RX ORDER — SODIUM CHLORIDE 9 MG/ML
1000 INJECTION INTRAMUSCULAR; INTRAVENOUS; SUBCUTANEOUS
Refills: 0 | Status: DISCONTINUED | OUTPATIENT
Start: 2023-08-03 | End: 2023-08-06

## 2023-08-03 RX ORDER — KETOROLAC TROMETHAMINE 30 MG/ML
15 SYRINGE (ML) INJECTION ONCE
Refills: 0 | Status: DISCONTINUED | OUTPATIENT
Start: 2023-08-03 | End: 2023-08-03

## 2023-08-03 RX ADMIN — Medication 15 MILLIGRAM(S): at 04:44

## 2023-08-03 RX ADMIN — CEFTRIAXONE 1000 MILLIGRAM(S): 500 INJECTION, POWDER, FOR SOLUTION INTRAMUSCULAR; INTRAVENOUS at 12:15

## 2023-08-03 RX ADMIN — SODIUM CHLORIDE 200 MILLILITER(S): 9 INJECTION INTRAMUSCULAR; INTRAVENOUS; SUBCUTANEOUS at 22:39

## 2023-08-03 RX ADMIN — Medication 15 MILLIGRAM(S): at 21:28

## 2023-08-03 RX ADMIN — Medication 15 MILLIGRAM(S): at 21:43

## 2023-08-03 RX ADMIN — SODIUM CHLORIDE 200 MILLILITER(S): 9 INJECTION INTRAMUSCULAR; INTRAVENOUS; SUBCUTANEOUS at 12:15

## 2023-08-03 NOTE — PROGRESS NOTE ADULT - ASSESSMENT
32-year-old male with no significant medical history/ not on regular home meds.  presents to ED complaining of upper chest and arm pain after working out for the first time in a long time.  Patient did upper body work out yesterday did not take any supplements or preworkout's.  patient states that he has not been working ut for few moths but then did aggressive work out and heavy weight lifting yesterday. Notes that today he began to have extreme soreness of the upper extremities.  Notes he cannot straighten out his arms without being in pain.  Also admits to a nosebleed today from the right nare that has since subsided.  No trauma to the nose.  No blood thinners. no epistaxis since after arrival to hospital.   in ed noted with elevated cpk and balaji. also noted UA positive, elevated lfts.     b/l arm pain/ likely due to rhabdomyolysis / likely due to extensive work out   - no focal swelling/ or focal point tenderness noted. generalized tenderness in arms b/l.  no concern for nec fasc at this time.   - Switch Bicarb drip to NS IVF at 200 cc/hr  - CK worsening, trend  - Monitor renal function    balaji/ likely due to rhabdo   - Resolved  - Monitor    uti   - wbcs nl but ua noted to be grossly positive   - check urine cxs   - continue rocephin    epistaxis episode   - unlcear etiology   - no new episodes while in ed   - plts nl , not on any anti plts or AC.   - inr/ptt unremarkable  - montior     elevated lfts   - unclear etiology   - likely in setting of rhabdo  - f/u US  - monitor    obesity   - weigh loss advised     dvt ppx : scds for dvt ppx due episode of epistaxis     32-year-old male with no significant medical history/ not on regular home meds.  presents to ED complaining of upper chest and arm pain after working out for the first time in a long time.  Patient did upper body work out yesterday did not take any supplements or preworkout's.  patient states that he has not been working ut for few moths but then did aggressive work out and heavy weight lifting yesterday. Notes that today he began to have extreme soreness of the upper extremities.  Notes he cannot straighten out his arms without being in pain.  Also admits to a nosebleed today from the right nare that has since subsided.  No trauma to the nose.  No blood thinners. no epistaxis since after arrival to hospital.   in ed noted with elevated cpk and balaji. also noted UA positive, elevated lfts.     b/l arm pain/ likely due to rhabdomyolysis / likely due to extensive work out   - no focal swelling/ or focal point tenderness noted. generalized tenderness in arms b/l.  no concern for nec fasc at this time.   - Switch Bicarb drip to NS IVF at 200 cc/hr  - CK worsening, trend  - Monitor renal function    balaji/ likely due to rhabdo   - Resolved  - Monitor    uti   - wbcs nl but ua noted to be grossly positive   - Follow uCx  - continue rocephin    epistaxis episode   - unclear etiology  - plts nl , not on any anti plts or AC.   - inr/ptt wnl  - ressolved  -  monitor    elevated lfts   - likely in setting of rhabdo  - f/u US  - monitor    obesity   - weigh loss advised     dvt ppx : scds for dvt ppx due episode of epistaxis

## 2023-08-04 ENCOUNTER — TRANSCRIPTION ENCOUNTER (OUTPATIENT)
Age: 33
End: 2023-08-04

## 2023-08-04 LAB
ALBUMIN SERPL ELPH-MCNC: 3.7 G/DL — SIGNIFICANT CHANGE UP (ref 3.3–5.2)
ALP SERPL-CCNC: 75 U/L — SIGNIFICANT CHANGE UP (ref 40–120)
ALT FLD-CCNC: 178 U/L — HIGH
ANION GAP SERPL CALC-SCNC: 12 MMOL/L — SIGNIFICANT CHANGE UP (ref 5–17)
AST SERPL-CCNC: 425 U/L — HIGH
BILIRUB SERPL-MCNC: 0.4 MG/DL — SIGNIFICANT CHANGE UP (ref 0.4–2)
BUN SERPL-MCNC: 13.8 MG/DL — SIGNIFICANT CHANGE UP (ref 8–20)
CALCIUM SERPL-MCNC: 8.3 MG/DL — LOW (ref 8.4–10.5)
CHLORIDE SERPL-SCNC: 105 MMOL/L — SIGNIFICANT CHANGE UP (ref 96–108)
CK MB CFR SERPL CALC: 33.3 NG/ML — HIGH (ref 0–6.7)
CK SERPL-CCNC: CRITICAL HIGH U/L (ref 30–200)
CO2 SERPL-SCNC: 21 MMOL/L — LOW (ref 22–29)
CREAT SERPL-MCNC: 1.11 MG/DL — SIGNIFICANT CHANGE UP (ref 0.5–1.3)
EGFR: 90 ML/MIN/1.73M2 — SIGNIFICANT CHANGE UP
GLUCOSE SERPL-MCNC: 100 MG/DL — HIGH (ref 70–99)
HCT VFR BLD CALC: 43.1 % — SIGNIFICANT CHANGE UP (ref 39–50)
HGB BLD-MCNC: 14.9 G/DL — SIGNIFICANT CHANGE UP (ref 13–17)
MCHC RBC-ENTMCNC: 29.5 PG — SIGNIFICANT CHANGE UP (ref 27–34)
MCHC RBC-ENTMCNC: 34.6 GM/DL — SIGNIFICANT CHANGE UP (ref 32–36)
MCV RBC AUTO: 85.3 FL — SIGNIFICANT CHANGE UP (ref 80–100)
PLATELET # BLD AUTO: 157 K/UL — SIGNIFICANT CHANGE UP (ref 150–400)
POTASSIUM SERPL-MCNC: 4.1 MMOL/L — SIGNIFICANT CHANGE UP (ref 3.5–5.3)
POTASSIUM SERPL-SCNC: 4.1 MMOL/L — SIGNIFICANT CHANGE UP (ref 3.5–5.3)
PROT SERPL-MCNC: 6.2 G/DL — LOW (ref 6.6–8.7)
RBC # BLD: 5.05 M/UL — SIGNIFICANT CHANGE UP (ref 4.2–5.8)
RBC # FLD: 12.5 % — SIGNIFICANT CHANGE UP (ref 10.3–14.5)
SODIUM SERPL-SCNC: 138 MMOL/L — SIGNIFICANT CHANGE UP (ref 135–145)
WBC # BLD: 5.77 K/UL — SIGNIFICANT CHANGE UP (ref 3.8–10.5)
WBC # FLD AUTO: 5.77 K/UL — SIGNIFICANT CHANGE UP (ref 3.8–10.5)

## 2023-08-04 PROCEDURE — 99232 SBSQ HOSP IP/OBS MODERATE 35: CPT | Mod: GC

## 2023-08-04 PROCEDURE — 76705 ECHO EXAM OF ABDOMEN: CPT | Mod: 26

## 2023-08-04 RX ORDER — TRAMADOL HYDROCHLORIDE 50 MG/1
25 TABLET ORAL EVERY 6 HOURS
Refills: 0 | Status: DISCONTINUED | OUTPATIENT
Start: 2023-08-04 | End: 2023-08-06

## 2023-08-04 RX ADMIN — CEFTRIAXONE 1000 MILLIGRAM(S): 500 INJECTION, POWDER, FOR SOLUTION INTRAMUSCULAR; INTRAVENOUS at 13:20

## 2023-08-04 RX ADMIN — SODIUM CHLORIDE 200 MILLILITER(S): 9 INJECTION INTRAMUSCULAR; INTRAVENOUS; SUBCUTANEOUS at 03:41

## 2023-08-04 NOTE — DISCHARGE NOTE PROVIDER - HOSPITAL COURSE
Pt. is a 33 y/o male with no pertinent past medical history and not on any home medications presenting on Aug 1st because of chest pain, bilateral upper extremity pain, and episode of epistaxis following 1st workout in few months and was admitted for rhabdomyolysis, acute kidney injury (MARCELLA), and urinary tract infection(UTI). MARCELLA resolved, Cr:1.37-1.04, on Aug 2nd following normal saline IV fluids with sodium bicarbonate infusion. Rhabdomyolysis managed with aggressive NS IVF, increasing from 200ml/hr to 250 ml/hr on Aug 4th, as Creatinine Kinase levels continued to trend upward, doubled from day of admission, 14,542 to 15041. Suspected UTI, due to positive UA, was treated with rocephin 1,000mg, and urine culture later shown to have normal urogenital estefania. Pt. also reported to have elevated LFTs (AST: 336-425 AND alt: 116-178), trending upward on stay, and on ultrasound was found to have hepatic steatosis. Pt. is a 33 y/o male with no pertinent past medical history and not on any home medications presenting on Aug 1st because of chest pain, bilateral upper extremity pain, and episode of epistaxis following 1st workout in few months and was admitted for rhabdomyolysis, acute kidney injury (MARCELLA), and urinary tract infection(UTI). MARCELLA resolved, Cr:1.37-1.04, on Aug 2nd following normal saline IV fluids with sodium bicarbonate infusion. Rhabdomyolysis managed with aggressive NS IVF, increasing from 200ml/hr to 250 ml/hr on Aug 4th, as Creatinine Kinase levels continued to trend upward, doubled from day of admission, 14,542 to 48077. Suspected UTI, due to positive UA, was treated with rocephin 1,000mg, and urine culture later shown to have normal urogenital estefania. Pt. also reported to have elevated LFTs (AST: 336-425 AND alt: 116-178), trending upward on stay, and on ultrasound was found to have hepatic steatosis. CK levels trended down. The patient is hemodynamically stable for discharge with appropriate follow up. 33 y/o male with no pertinent past medical history and not on any home medications presenting on Aug 1st because of chest pain, bilateral upper extremity pain, and episode of epistaxis following 1st workout in few months and was admitted for rhabdomyolysis, acute kidney injury (MARCELLA), and urinary tract infection(UTI). MARCELLA resolved, Cr:1.37-1.04, on Aug 2nd following normal saline IV fluids with sodium bicarbonate infusion. Rhabdomyolysis managed with aggressive NS IVF, increasing from 200ml/hr to 250 ml/hr on Aug 4th, as Creatinine Kinase levels continued to trend upward, doubled from day of admission, 14,542 to 27048. Suspected UTI, due to positive UA, was treated with rocephin 1,000mg, and urine culture later shown to have normal urogenital estefania. Pt. also reported to have elevated LFTs (AST: 336-425 AND alt: 116-178), trending upward on stay, and on ultrasound was found to have hepatic steatosis. CK levels trended down significantly over 2 consecutive days (26,000 to 12,000). The patient is now medically optimized and ready for discharge with appropriate follow up.

## 2023-08-04 NOTE — DISCHARGE NOTE PROVIDER - NSDCCPCAREPLAN_GEN_ALL_CORE_FT
PRINCIPAL DISCHARGE DIAGNOSIS  Diagnosis: Rhabdomyolysis  Assessment and Plan of Treatment: Follow up with primary care provider within 1 week. Return to ED if noted to have b/l arm pain.      SECONDARY DISCHARGE DIAGNOSES  Diagnosis: Hepatic steatosis  Assessment and Plan of Treatment: Follow up with primary care provider within 1 week.    Diagnosis: MARCELLA (acute kidney injury)  Assessment and Plan of Treatment:      PRINCIPAL DISCHARGE DIAGNOSIS  Diagnosis: Rhabdomyolysis  Assessment and Plan of Treatment: Follow up with primary care provider within 1 week. Return to ED if noted to have b/l arm pain.      SECONDARY DISCHARGE DIAGNOSES  Diagnosis: Hepatic steatosis  Assessment and Plan of Treatment: Follow up with primary care provider within 1 week.     PRINCIPAL DISCHARGE DIAGNOSIS  Diagnosis: Rhabdomyolysis  Assessment and Plan of Treatment: Follow up with primary care provider within 1 week. Return to ED if noted to have worsening B/L  arm pain.      SECONDARY DISCHARGE DIAGNOSES  Diagnosis: Hepatic steatosis  Assessment and Plan of Treatment: Follow up with primary care provider within 1 week.

## 2023-08-04 NOTE — PROGRESS NOTE ADULT - ASSESSMENT
Pt. is a 33 y/o male with no pertinent pmhx/ no home meds, presenting because of chest pain and b/l UE pain following upper body workout, noted 1st workout in few months. Also, had an episode of epistaxis, right nare, which has resolved. Was reported to have elevated cpk, creatinine, +UA, and thus admitted for rhabdomyolysis, MARCELLA, and UTI.      b/l arm pain/ likely due to rhabdomyolysis / likely due to extensive work out   - no focal swelling/ or focal point tenderness noted. generalized tenderness in arms b/l.  no concern for nec fasc at this time.   - DC bicarb drip, on NS IVF at 250 cc/hr  - CK worsening, trend  - Monitor renal function    marcella/ likely due to rhabdo   - Resolved  - Monitor renal function    uti   - wbcs nl but ua noted to be grossly positive   - Follow uCx: normal after 48 hrs  - continue rocephin    epistaxis episode   - unclear etiology  - plts nl , not on any anti plts or AC.   - inr/ptt wnl  - ressolved  -  monitor    elevated lfts   - likely in setting of rhabdo  - f/u US  - monitor    obesity   - weigh loss advised     dvt ppx : scds for dvt ppx due episode of epistaxis Pt. is a 33 y/o male with no pertinent pmhx/ no home meds, presenting because of chest pain and b/l UE pain following upper body workout, noted 1st workout in few months. Also, had an episode of epistaxis, right nare, which has resolved. Was reported to have elevated cpk, creatinine, +UA, and thus admitted for rhabdomyolysis, MARCELLA, and UTI.      b/l arm pain/ likely due to rhabdomyolysis / likely due to extensive work out   - no focal swelling/ or focal point tenderness noted. generalized tenderness in arms b/l.  no concern for nec fasc at this time.   - DC bicarb drip, on NS IVF at 250 cc/hr  - CK worsening, trend  - Monitor renal function    marcella/ likely due to rhabdo   - Resolved  - Monitor renal function    uti   - wbcs nl but ua noted to be grossly positive   - Follow uCx: normal after 48 hrs  - continue rocephin    epistaxis episode   - unclear etiology  - plts nl , not on any anti plts or AC.   - inr/ptt wnl  - ressolved  -  monitor    elevated lfts   - likely in setting of rhabdo  - US: Hepatic Steatosis  - monitor    obesity   - weigh loss advised     dvt ppx : scds for dvt ppx due episode of epistaxis

## 2023-08-04 NOTE — DISCHARGE NOTE PROVIDER - ATTENDING DISCHARGE PHYSICAL EXAMINATION:
Vital Signs Last 24 Hrs  T(F): 97.7 (06 Aug 2023 05:28), Max: 98 (05 Aug 2023 09:26)  HR: 69 (06 Aug 2023 05:28) (69 - 80)  BP: 123/82 (06 Aug 2023 05:28) (123/82 - 135/87)  RR: 18 (06 Aug 2023 05:28) (18 - 18)  SpO2: 95% (06 Aug 2023 05:28) (95% - 97%)    Physical Exam:  Constitutional: alert and oriented, in no acute distress   Neck: Soft and supple, No LAD, No JVD  Respiratory: Clear to auscultation bilaterally, no wheezes or crackles  Cardiovascular: Regular rate and rhythm, no murmurs, gallops, rubs  Gastrointestinal: Soft, non-tender to palpation, +bs  Vascular: 2+ peripheral pulses  Neurological: A/O x 3, no focal neurological deficits  Musculoskeletal: 5/5 strength b/l upper and lower extremities, no lower extremity edema bilaterally

## 2023-08-04 NOTE — PROGRESS NOTE ADULT - ATTENDING COMMENTS
Patient seen and examined at bedside. Pt being treated for rhabdomyolysis. CK worsening. Will continue aggressive hydration and monitor. Potential discharge this weekend if CK improves.

## 2023-08-05 LAB
ALBUMIN SERPL ELPH-MCNC: 3.8 G/DL — SIGNIFICANT CHANGE UP (ref 3.3–5.2)
ALP SERPL-CCNC: 77 U/L — SIGNIFICANT CHANGE UP (ref 40–120)
ALT FLD-CCNC: 219 U/L — HIGH
ANION GAP SERPL CALC-SCNC: 13 MMOL/L — SIGNIFICANT CHANGE UP (ref 5–17)
AST SERPL-CCNC: 465 U/L — HIGH
BILIRUB SERPL-MCNC: 0.4 MG/DL — SIGNIFICANT CHANGE UP (ref 0.4–2)
BUN SERPL-MCNC: 13.3 MG/DL — SIGNIFICANT CHANGE UP (ref 8–20)
CALCIUM SERPL-MCNC: 8.4 MG/DL — SIGNIFICANT CHANGE UP (ref 8.4–10.5)
CHLORIDE SERPL-SCNC: 105 MMOL/L — SIGNIFICANT CHANGE UP (ref 96–108)
CK MB CFR SERPL CALC: 30.4 NG/ML — HIGH (ref 0–6.7)
CK SERPL-CCNC: CRITICAL HIGH U/L (ref 30–200)
CO2 SERPL-SCNC: 21 MMOL/L — LOW (ref 22–29)
CREAT SERPL-MCNC: 1.12 MG/DL — SIGNIFICANT CHANGE UP (ref 0.5–1.3)
EGFR: 89 ML/MIN/1.73M2 — SIGNIFICANT CHANGE UP
GLUCOSE SERPL-MCNC: 92 MG/DL — SIGNIFICANT CHANGE UP (ref 70–99)
HCT VFR BLD CALC: 45.1 % — SIGNIFICANT CHANGE UP (ref 39–50)
HGB BLD-MCNC: 15.5 G/DL — SIGNIFICANT CHANGE UP (ref 13–17)
MCHC RBC-ENTMCNC: 29.5 PG — SIGNIFICANT CHANGE UP (ref 27–34)
MCHC RBC-ENTMCNC: 34.4 GM/DL — SIGNIFICANT CHANGE UP (ref 32–36)
MCV RBC AUTO: 85.9 FL — SIGNIFICANT CHANGE UP (ref 80–100)
PHOSPHATE SERPL-MCNC: 4.1 MG/DL — SIGNIFICANT CHANGE UP (ref 2.4–4.7)
PLATELET # BLD AUTO: 170 K/UL — SIGNIFICANT CHANGE UP (ref 150–400)
POTASSIUM SERPL-MCNC: 4.1 MMOL/L — SIGNIFICANT CHANGE UP (ref 3.5–5.3)
POTASSIUM SERPL-SCNC: 4.1 MMOL/L — SIGNIFICANT CHANGE UP (ref 3.5–5.3)
PROT SERPL-MCNC: 6.5 G/DL — LOW (ref 6.6–8.7)
RBC # BLD: 5.25 M/UL — SIGNIFICANT CHANGE UP (ref 4.2–5.8)
RBC # FLD: 12.4 % — SIGNIFICANT CHANGE UP (ref 10.3–14.5)
SODIUM SERPL-SCNC: 138 MMOL/L — SIGNIFICANT CHANGE UP (ref 135–145)
WBC # BLD: 6.5 K/UL — SIGNIFICANT CHANGE UP (ref 3.8–10.5)
WBC # FLD AUTO: 6.5 K/UL — SIGNIFICANT CHANGE UP (ref 3.8–10.5)

## 2023-08-05 PROCEDURE — 99232 SBSQ HOSP IP/OBS MODERATE 35: CPT

## 2023-08-05 RX ORDER — CIPROFLOXACIN LACTATE 400MG/40ML
500 VIAL (ML) INTRAVENOUS EVERY 12 HOURS
Refills: 0 | Status: DISCONTINUED | OUTPATIENT
Start: 2023-08-05 | End: 2023-08-06

## 2023-08-05 RX ADMIN — Medication 500 MILLIGRAM(S): at 17:27

## 2023-08-05 NOTE — PROGRESS NOTE ADULT - ASSESSMENT
31 y/o male with no pertinent pmhx/ no home meds, presenting because of chest pain and b/l UE pain following upper body workout, noted 1st workout in few months. Also, had an episode of epistaxis, right nare, which has resolved. Was reported to have elevated cpk, creatinine, +UA, and thus admitted for rhabdomyolysis, MARCELLA, and UTI.    Rhabdomyolysis secondary to extensive workup with bilateral arm pain  - Continue IVF  - CK now trending down  - Monitor BMP    MARCELLA due to rhabdo  - Resolved  - Monitor BMP    UTI  - UA positive  - Urine culture negative  - Continue Ceftriaxone    Epistaxis  - resolved  - unclear etiology  - plts nl , not on any anti plts or AC    Elevated LFTs  - Likely in the setting of rhabdo  - Abdominal US showing hepatic steatosis    obesity   - weight loss advised     DVT ppx  - SCD 33 y/o male with no pertinent pmhx/ no home meds, presenting because of chest pain and b/l UE pain following upper body workout, noted 1st workout in few months. Also, had an episode of epistaxis, right nare, which has resolved. Was reported to have elevated cpk, creatinine, +UA, and thus admitted for rhabdomyolysis, MARCELLA, and UTI.    Rhabdomyolysis secondary to extensive workup with bilateral arm pain  - Continue IVF  - CK now trending down  - Monitor BMP    MARCELLA due to rhabdo  - Resolved  - Monitor BMP    UTI  - UA positive  - Urine culture negative  - s/p Ceftriaxone  - Continue Cipro    Epistaxis  - resolved  - unclear etiology  - plts nl , not on any anti plts or AC    Elevated LFTs  - Likely in the setting of rhabdo  - Abdominal US showing hepatic steatosis    obesity   - weight loss advised     DVT ppx  - SCD

## 2023-08-05 NOTE — PROGRESS NOTE ADULT - SUBJECTIVE AND OBJECTIVE BOX
Chief complaint: Rhabdomyolysis    Patient seen and examined at bedside. No acute overnight events reported. No fever, chills, cough, nausea or vomiting.     Vital Signs Last 24 Hrs  T(F): 98 (05 Aug 2023 09:26), Max: 99 (04 Aug 2023 16:55)  HR: 71 (05 Aug 2023 09:26) (71 - 80)  BP: 135/87 (05 Aug 2023 09:26) (135/87 - 156/82)  RR: 18 (05 Aug 2023 09:26) (16 - 18)  SpO2: 97% (05 Aug 2023 09:26) (95% - 97%)    Physical Exam:  Constitutional: alert and oriented, in no acute distress   Neck: Soft and supple  Respiratory: Clear to auscultation bilaterally, no wheezes or crackles  Cardiovascular: Regular rate and rhyhtm, no murmurs, gallops, rubs  Gastrointestinal: Soft, non-tender to palpation, +bs  Vascular: 2+ peripheral pulses  Neurological: A/O x 3, no focal neurological deficits  Musculoskeletal: 5/5 strength b/l upper and lower extremities, no lower extremity edema bilaterally    Labs:                        15.5   6.50  )-----------( 170      ( 05 Aug 2023 04:00 )             45.1   08-05    138  |  105  |  13.3  ----------------------------<  92  4.1   |  21.0<L>  |  1.12    Ca    8.4      05 Aug 2023 04:00  Phos  4.1     08-05    TPro  6.5<L>  /  Alb  3.8  /  TBili  0.4  /  DBili  x   /  AST  465<H>  /  ALT  219<H>  /  AlkPhos  77  08-05  
CHIEF COMPLAINT & BRIEF HOSPITAL COURSE: Pt. is a 33 y/o male with no pertinent pmhx presenting because of chest pain and b/l UE pain following 1st workout in few months and was admitted for rhabdomyolysis and MARCELLA. UA was also positive, pt. started on rocephin. MARCELLA resolved post IVF with bicarb. Rhabdomyolysis continued to be managed with IVF.      INTERVAL HPI/OVERNIGHT EVENTS: Pt. seen and examined at bedside. Received dose of toradol for b/l UE pain, but stated to be better this morning. Pt. is unable to fully extend elbows and has pain with flexion and extension, but otherwise has no limitations and is doing well.      REVIEW OF SYSTEMS:  CONSTITUTIONAL: No fever, weight loss, or fatigue  RESPIRATORY: No cough, wheezing, chills or hemoptysis; No shortness of breath  CARDIOVASCULAR: No chest pain, palpitations, dizziness, or leg swelling  GASTROINTESTINAL: No abdominal pain. No nausea, vomiting, or hematemesis; No diarrhea or constipation. No melena or hematochezia.  GENITOURINARY: No dysuria or hematuria, urinary frequency  Musculoskeletal: (+) Pain with elbow flexion and extension  NEUROLOGICAL: No headaches, memory loss, loss of strength, numbness, or tremors  SKIN: No itching, burning, rashes, or lesions     MEDICATIONS  (STANDING):  cefTRIAXone Injectable. 1000 milliGRAM(s) IV Push every 24 hours  ondansetron Injectable 4 milliGRAM(s) IV Push once  sodium chloride 0.9%. 1000 milliLiter(s) (250 mL/Hr) IV Continuous <Continuous>    MEDICATIONS  (PRN):  traMADol 25 milliGRAM(s) Oral every 6 hours PRN Severe Pain (7 - 10)      Vital Signs Last 24 Hrs  T(C): 36.7 (04 Aug 2023 05:53), Max: 36.8 (03 Aug 2023 21:31)  T(F): 98 (04 Aug 2023 05:53), Max: 98.3 (03 Aug 2023 21:31)  HR: 70 (04 Aug 2023 05:53) (70 - 83)  BP: 147/81 (04 Aug 2023 05:53) (131/81 - 147/81)  BP(mean): --  RR: 20 (04 Aug 2023 05:53) (18 - 20)  SpO2: 98% (04 Aug 2023 05:53) (95% - 98%)    Parameters below as of 04 Aug 2023 05:53  Patient On (Oxygen Delivery Method): room air        PHYSICAL EXAMINATION:  GENERAL: NAD, well built  HEAD:  Atraumatic, Normocephalic  EYES:  conjunctiva and sclera clear  NECK: Supple, No JVD, Normal thyroid  CHEST/LUNG: Clear to auscultation. Clear to percussion bilaterally; No rales, rhonchi, wheezing, or rubs  HEART: Regular rate and rhythm; No murmurs, rubs, or gallops  ABDOMEN: Soft, Nontender, Nondistended; Bowel sounds present, no pain or masses on palpation  NERVOUS SYSTEM:  Alert & Oriented X3  MSK: (+) B/L UE ROM limited, unable to fully extend elbows. Pain with flexion and extension of elbows. 5/5 strength b/l UE and LE  : voiding well  EXTREMITIES:  2+ Peripheral Pulses, No clubbing, cyanosis, or edema  SKIN: warm dry, no swelling UE B/L                          14.9   5.77  )-----------( 157      ( 04 Aug 2023 06:20 )             43.1     08-04    138  |  105  |  13.8  ----------------------------<  100<H>  4.1   |  21.0<L>  |  1.11    Ca    8.3<L>      04 Aug 2023 06:20  Mg     2.0     08-03    TPro  6.2<L>  /  Alb  3.7  /  TBili  0.4  /  DBili  x   /  AST  425<H>  /  ALT  178<H>  /  AlkPhos  75  08-04    LIVER FUNCTIONS - ( 04 Aug 2023 06:20 )  Alb: 3.7 g/dL / Pro: 6.2 g/dL / ALK PHOS: 75 U/L / ALT: 178 U/L / AST: 425 U/L / GGT: x           CARDIAC MARKERS ( 04 Aug 2023 06:20 )  x     / x     / 15869 U/L / x     / 33.3 ng/mL  CARDIAC MARKERS ( 03 Aug 2023 06:36 )  x     / x     / 30428 U/L / x     / 44.9 ng/mL      PTT - ( 03 Aug 2023 06:36 )  PTT:30.9 sec    I&O's Summary        Culture - Urine (collected 02 Aug 2023 04:40)  Source: Clean Catch Clean Catch (Midstream)  Final Report (03 Aug 2023 06:48):    <10,000 CFU/mL Normal Urogenital Jie        CAPILLARY BLOOD GLUCOSE      RADIOLOGY & ADDITIONAL TESTS:                  
[facility] Division of Hospital Medicine  Akil Gill MD    Chief Complaint:  Patient is a 32y old  Male who presents with a chief complaint of rhabdo, balaji (03 Aug 2023 09:46)      SUBJECTIVE / OVERNIGHT EVENTS:  Patient seen and examined at bedside. No acute events reported overnight. Feeling better, CK worsening.     MEDICATIONS  (STANDING):  cefTRIAXone Injectable. 1000 milliGRAM(s) IV Push every 24 hours  ondansetron Injectable 4 milliGRAM(s) IV Push once  sodium chloride 0.9%. 1000 milliLiter(s) (200 mL/Hr) IV Continuous <Continuous>    MEDICATIONS  (PRN):        I&O's Summary      PHYSICAL EXAM:  Vital Signs Last 24 Hrs  T(C): 36.8 (03 Aug 2023 04:58), Max: 36.8 (03 Aug 2023 04:58)  T(F): 98.3 (03 Aug 2023 04:58), Max: 98.3 (03 Aug 2023 04:58)  HR: 70 (03 Aug 2023 04:58) (70 - 76)  BP: 139/91 (03 Aug 2023 04:58) (135/80 - 139/91)  BP(mean): --  RR: 18 (03 Aug 2023 04:58) (18 - 18)  SpO2: 96% (03 Aug 2023 04:58) (96% - 99%)    Parameters below as of 02 Aug 2023 23:19  Patient On (Oxygen Delivery Method): room air            CONSTITUTIONAL: NAD  HEENT: NC/AT, PERRL, no JVD  RESPIRATORY: CTA bilaterally, normal effort  CARDIOVASCULAR: RRR, S1/S2+, no m/g/r  ABDOMEN: Nontender to palpation, normoactive bowel sounds, no rebound/guarding  MUSCULOSKELETAL: No edema, cyanosis or deformities.  PSYCH: Calm, affect appropriate.  NEUROLOGY: Awake, alert, no focal neurological deficits.   SKIN: No rashes; no palpable lesions  VASC: Distal pulses palpable    LABS:                        15.8   5.52  )-----------( 173      ( 03 Aug 2023 06:36 )             45.4     08-03    138  |  103  |  14.7  ----------------------------<  100<H>  4.1   |  21.0<L>  |  1.14    Ca    8.6      03 Aug 2023 06:36  Mg     2.0     08-03    TPro  6.4<L>  /  Alb  3.7  /  TBili  0.6  /  DBili  x   /  AST  387<H>  /  ALT  147<H>  /  AlkPhos  71  08-03    PT/INR - ( 02 Aug 2023 10:30 )   PT: 11.1 sec;   INR: 1.00 ratio         PTT - ( 03 Aug 2023 06:36 )  PTT:30.9 sec  CARDIAC MARKERS ( 03 Aug 2023 06:36 )  x     / x     / 34672 U/L / x     / 44.9 ng/mL  CARDIAC MARKERS ( 02 Aug 2023 05:00 )  x     / x     / 10002 U/L / x     / 43.7 ng/mL  CARDIAC MARKERS ( 02 Aug 2023 02:30 )  x     / x     / 95695 U/L / x     / 31.3 ng/mL      Urinalysis Basic - ( 03 Aug 2023 06:36 )    Color: x / Appearance: x / SG: x / pH: x  Gluc: 100 mg/dL / Ketone: x  / Bili: x / Urobili: x   Blood: x / Protein: x / Nitrite: x   Leuk Esterase: x / RBC: x / WBC x   Sq Epi: x / Non Sq Epi: x / Bacteria: x        Culture - Urine (collected 02 Aug 2023 04:40)  Source: Clean Catch Clean Catch (Midstream)  Final Report (03 Aug 2023 06:48):    <10,000 CFU/mL Normal Urogenital Jie      CAPILLARY BLOOD GLUCOSE            RADIOLOGY & ADDITIONAL TESTS:  Results Reviewed:   Imaging Personally Reviewed:  Electrocardiogram Personally Reviewed:

## 2023-08-06 ENCOUNTER — TRANSCRIPTION ENCOUNTER (OUTPATIENT)
Age: 33
End: 2023-08-06

## 2023-08-06 VITALS
SYSTOLIC BLOOD PRESSURE: 133 MMHG | HEART RATE: 89 BPM | DIASTOLIC BLOOD PRESSURE: 62 MMHG | TEMPERATURE: 98 F | OXYGEN SATURATION: 95 % | RESPIRATION RATE: 18 BRPM

## 2023-08-06 LAB
ANION GAP SERPL CALC-SCNC: 11 MMOL/L — SIGNIFICANT CHANGE UP (ref 5–17)
BASOPHILS # BLD AUTO: 0.02 K/UL — SIGNIFICANT CHANGE UP (ref 0–0.2)
BASOPHILS NFR BLD AUTO: 0.3 % — SIGNIFICANT CHANGE UP (ref 0–2)
BILIRUB SERPL-MCNC: 0.4 MG/DL — SIGNIFICANT CHANGE UP (ref 0.4–2)
BUN SERPL-MCNC: 12.1 MG/DL — SIGNIFICANT CHANGE UP (ref 8–20)
CALCIUM SERPL-MCNC: 8.8 MG/DL — SIGNIFICANT CHANGE UP (ref 8.4–10.5)
CHLORIDE SERPL-SCNC: 105 MMOL/L — SIGNIFICANT CHANGE UP (ref 96–108)
CK MB CFR SERPL CALC: 17.4 NG/ML — HIGH (ref 0–6.7)
CK SERPL-CCNC: CRITICAL HIGH U/L (ref 30–200)
CO2 SERPL-SCNC: 23 MMOL/L — SIGNIFICANT CHANGE UP (ref 22–29)
CREAT SERPL-MCNC: 1.17 MG/DL — SIGNIFICANT CHANGE UP (ref 0.5–1.3)
EGFR: 84 ML/MIN/1.73M2 — SIGNIFICANT CHANGE UP
EOSINOPHIL # BLD AUTO: 0.08 K/UL — SIGNIFICANT CHANGE UP (ref 0–0.5)
EOSINOPHIL NFR BLD AUTO: 1.2 % — SIGNIFICANT CHANGE UP (ref 0–6)
GLUCOSE SERPL-MCNC: 103 MG/DL — HIGH (ref 70–99)
HCT VFR BLD CALC: 45.7 % — SIGNIFICANT CHANGE UP (ref 39–50)
HGB BLD-MCNC: 15.4 G/DL — SIGNIFICANT CHANGE UP (ref 13–17)
IMM GRANULOCYTES NFR BLD AUTO: 0.9 % — SIGNIFICANT CHANGE UP (ref 0–0.9)
INR BLD: 1.08 RATIO — SIGNIFICANT CHANGE UP (ref 0.85–1.18)
LYMPHOCYTES # BLD AUTO: 1.76 K/UL — SIGNIFICANT CHANGE UP (ref 1–3.3)
LYMPHOCYTES # BLD AUTO: 26.5 % — SIGNIFICANT CHANGE UP (ref 13–44)
MCHC RBC-ENTMCNC: 29.4 PG — SIGNIFICANT CHANGE UP (ref 27–34)
MCHC RBC-ENTMCNC: 33.7 GM/DL — SIGNIFICANT CHANGE UP (ref 32–36)
MCV RBC AUTO: 87.2 FL — SIGNIFICANT CHANGE UP (ref 80–100)
MELD SCORE WITH DIALYSIS: 21 POINTS — SIGNIFICANT CHANGE UP
MELD SCORE WITHOUT DIALYSIS: 9 POINTS — SIGNIFICANT CHANGE UP
MONOCYTES # BLD AUTO: 0.46 K/UL — SIGNIFICANT CHANGE UP (ref 0–0.9)
MONOCYTES NFR BLD AUTO: 6.9 % — SIGNIFICANT CHANGE UP (ref 2–14)
NEUTROPHILS # BLD AUTO: 4.26 K/UL — SIGNIFICANT CHANGE UP (ref 1.8–7.4)
NEUTROPHILS NFR BLD AUTO: 64.2 % — SIGNIFICANT CHANGE UP (ref 43–77)
PLATELET # BLD AUTO: 158 K/UL — SIGNIFICANT CHANGE UP (ref 150–400)
POTASSIUM SERPL-MCNC: 4.4 MMOL/L — SIGNIFICANT CHANGE UP (ref 3.5–5.3)
POTASSIUM SERPL-SCNC: 4.4 MMOL/L — SIGNIFICANT CHANGE UP (ref 3.5–5.3)
PROTHROM AB SERPL-ACNC: 12 SEC — SIGNIFICANT CHANGE UP (ref 9.5–13)
RBC # BLD: 5.24 M/UL — SIGNIFICANT CHANGE UP (ref 4.2–5.8)
RBC # FLD: 12.6 % — SIGNIFICANT CHANGE UP (ref 10.3–14.5)
SODIUM SERPL-SCNC: 139 MMOL/L — SIGNIFICANT CHANGE UP (ref 135–145)
WBC # BLD: 6.64 K/UL — SIGNIFICANT CHANGE UP (ref 3.8–10.5)
WBC # FLD AUTO: 6.64 K/UL — SIGNIFICANT CHANGE UP (ref 3.8–10.5)

## 2023-08-06 PROCEDURE — 80053 COMPREHEN METABOLIC PANEL: CPT

## 2023-08-06 PROCEDURE — 36415 COLL VENOUS BLD VENIPUNCTURE: CPT

## 2023-08-06 PROCEDURE — 99285 EMERGENCY DEPT VISIT HI MDM: CPT | Mod: 25

## 2023-08-06 PROCEDURE — 82550 ASSAY OF CK (CPK): CPT

## 2023-08-06 PROCEDURE — 85025 COMPLETE CBC W/AUTO DIFF WBC: CPT

## 2023-08-06 PROCEDURE — 83735 ASSAY OF MAGNESIUM: CPT

## 2023-08-06 PROCEDURE — 82553 CREATINE MB FRACTION: CPT

## 2023-08-06 PROCEDURE — 80061 LIPID PANEL: CPT

## 2023-08-06 PROCEDURE — 85027 COMPLETE CBC AUTOMATED: CPT

## 2023-08-06 PROCEDURE — 83036 HEMOGLOBIN GLYCOSYLATED A1C: CPT

## 2023-08-06 PROCEDURE — 80048 BASIC METABOLIC PNL TOTAL CA: CPT

## 2023-08-06 PROCEDURE — 85610 PROTHROMBIN TIME: CPT

## 2023-08-06 PROCEDURE — 99239 HOSP IP/OBS DSCHRG MGMT >30: CPT

## 2023-08-06 PROCEDURE — 81001 URINALYSIS AUTO W/SCOPE: CPT

## 2023-08-06 PROCEDURE — 85730 THROMBOPLASTIN TIME PARTIAL: CPT

## 2023-08-06 PROCEDURE — 96374 THER/PROPH/DIAG INJ IV PUSH: CPT

## 2023-08-06 PROCEDURE — 84100 ASSAY OF PHOSPHORUS: CPT

## 2023-08-06 PROCEDURE — 87086 URINE CULTURE/COLONY COUNT: CPT

## 2023-08-06 PROCEDURE — 76705 ECHO EXAM OF ABDOMEN: CPT

## 2023-08-06 RX ADMIN — Medication 500 MILLIGRAM(S): at 05:38

## 2023-08-06 NOTE — DISCHARGE NOTE NURSING/CASE MANAGEMENT/SOCIAL WORK - PATIENT PORTAL LINK FT
You can access the FollowMyHealth Patient Portal offered by [facility] by registering at the following website: [email or web address]. By joining [employer]’s FollowMyHealth portal, you will also be able to view your health information using other applications (apps) compatible with our system.

## 2023-08-16 NOTE — ED PROVIDER NOTE - EYES, MLM
----- Message from Erica Bhatt sent at 8/16/2023 10:26 AM CDT -----  Regarding: Refills   Contact: [phone]  I have sent a couple of messages and also talked to Emil about refills for Venlafaxine and  Atorvastatin that I need sent to [facility] ([facility] in [place].   The Pharmacy has also sent a message.   I am going out of town on Saturday, and can’t just stop taking the Venlafaxine.   Please get back to me  Erica Bhatt   Clear bilaterally, pupils equal, round and reactive to light.

## 2023-12-26 ENCOUNTER — EMERGENCY (EMERGENCY)
Facility: HOSPITAL | Age: 33
LOS: 1 days | Discharge: HOME | End: 2023-12-26
Attending: EMERGENCY MEDICINE
Payer: MEDICAID

## 2023-12-26 VITALS
RESPIRATION RATE: 18 BRPM | DIASTOLIC BLOOD PRESSURE: 87 MMHG | OXYGEN SATURATION: 96 % | WEIGHT: 279.99 LBS | HEIGHT: 71 IN | TEMPERATURE: 100 F | SYSTOLIC BLOOD PRESSURE: 133 MMHG | HEART RATE: 98 BPM

## 2023-12-26 DIAGNOSIS — Z90.49 ACQUIRED ABSENCE OF OTHER SPECIFIED PARTS OF DIGESTIVE TRACT: Chronic | ICD-10-CM

## 2023-12-26 LAB
FLUAV AG NPH QL: DETECTED
FLUAV AG NPH QL: DETECTED
FLUBV AG NPH QL: SIGNIFICANT CHANGE UP
FLUBV AG NPH QL: SIGNIFICANT CHANGE UP
RSV RNA NPH QL NAA+NON-PROBE: SIGNIFICANT CHANGE UP
RSV RNA NPH QL NAA+NON-PROBE: SIGNIFICANT CHANGE UP
SARS-COV-2 RNA SPEC QL NAA+PROBE: SIGNIFICANT CHANGE UP
SARS-COV-2 RNA SPEC QL NAA+PROBE: SIGNIFICANT CHANGE UP

## 2023-12-26 PROCEDURE — 99283 EMERGENCY DEPT VISIT LOW MDM: CPT

## 2023-12-26 PROCEDURE — 99284 EMERGENCY DEPT VISIT MOD MDM: CPT

## 2023-12-26 PROCEDURE — 87637 SARSCOV2&INF A&B&RSV AMP PRB: CPT

## 2023-12-26 RX ORDER — IBUPROFEN 200 MG
600 TABLET ORAL ONCE
Refills: 0 | Status: COMPLETED | OUTPATIENT
Start: 2023-12-26 | End: 2023-12-26

## 2023-12-26 RX ORDER — ACETAMINOPHEN 500 MG
2 TABLET ORAL
Qty: 56 | Refills: 0
Start: 2023-12-26 | End: 2024-01-01

## 2023-12-26 RX ORDER — IBUPROFEN 200 MG
1 TABLET ORAL
Qty: 28 | Refills: 0
Start: 2023-12-26 | End: 2024-01-01

## 2023-12-26 RX ORDER — ACETAMINOPHEN 500 MG
650 TABLET ORAL ONCE
Refills: 0 | Status: COMPLETED | OUTPATIENT
Start: 2023-12-26 | End: 2023-12-26

## 2023-12-26 RX ORDER — ONDANSETRON 8 MG/1
4 TABLET, FILM COATED ORAL ONCE
Refills: 0 | Status: COMPLETED | OUTPATIENT
Start: 2023-12-26 | End: 2023-12-26

## 2023-12-26 RX ORDER — ONDANSETRON 8 MG/1
1 TABLET, FILM COATED ORAL
Qty: 6 | Refills: 0
Start: 2023-12-26 | End: 2023-12-28

## 2023-12-26 RX ADMIN — Medication 200 MILLIGRAM(S): at 12:51

## 2023-12-26 RX ADMIN — Medication 650 MILLIGRAM(S): at 12:51

## 2023-12-26 RX ADMIN — Medication 600 MILLIGRAM(S): at 12:51

## 2023-12-26 RX ADMIN — ONDANSETRON 4 MILLIGRAM(S): 8 TABLET, FILM COATED ORAL at 12:45

## 2023-12-26 NOTE — ED ADULT NURSE NOTE - NSFALLUNIVINTERV_ED_ALL_ED
Bed/Stretcher in lowest position, wheels locked, appropriate side rails in place/Call bell, personal items and telephone in reach/Instruct patient to call for assistance before getting out of bed/chair/stretcher/Non-slip footwear applied when patient is off stretcher/[place] to call system/Physically safe environment - no spills, clutter or unnecessary equipment/Purposeful proactive rounding/Room/bathroom lighting operational, light cord in reach Bed/Stretcher in lowest position, wheels locked, appropriate side rails in place/Call bell, personal items and telephone in reach/Instruct patient to call for assistance before getting out of bed/chair/stretcher/Non-slip footwear applied when patient is off stretcher/[place] to call system/Physically safe environment - no spills, clutter or unnecessary equipment/Purposeful proactive rounding/Room/bathroom lighting operational, light cord in reach

## 2023-12-26 NOTE — ED ADULT TRIAGE NOTE - SOURCE OF INFORMATION
Patient
Detail Level: Detailed
Continue Regimen: spironolactone 100 mg qd\\nclindamycin gel qd-bid\\ntretinoin 0.1% cream qhs
Initiate Treatment: terbinafine 250mg 1 po qd x 1 week qmonth x 4 months

## 2023-12-26 NOTE — ED PROVIDER NOTE - NSFOLLOWUPINSTRUCTIONS_ED_ALL_ED_FT
Follow up with pcp within 3 days.     Viral Respiratory Infection    A viral respiratory infection is an illness that affects parts of the body used for breathing, like the lungs, nose, and throat. It is caused by a germ called a virus. Symptoms can include runny nose, coughing, sneezing, fatigue, body aches, sore throat, fever, or headache. Over the counter medicine can be used to manage the symptoms but the infection typically goes away on its own in 5 to 10 days.     SEEK IMMEDIATE MEDICAL CARE IF YOU HAVE ANY OF THE FOLLOWING SYMPTOMS: shortness of breath, chest pain, fever over 10 days, or lightheadedness/dizziness.

## 2023-12-26 NOTE — ED ADULT NURSE NOTE - OBJECTIVE STATEMENT
CC fatigue, body ache x 2 days and f/c/c/n/v/d since yesterday. hightest temp 100.2. AO4, NAD. pt currently resting in bed. will continue to monitor.

## 2023-12-26 NOTE — ED PROVIDER NOTE - CLINICAL SUMMARY MEDICAL DECISION MAKING FREE TEXT BOX
33M with URI sx. PE: lungs CTAB, mild TTP throughout chest.   meds, swab, reassess. 33M with URI sx. PE: lungs CTAB, mild TTP throughout chest.   meds, swab, reassess. Pt feeling improved. to fu with pcp. discussed return precautions.

## 2023-12-26 NOTE — ED PROVIDER NOTE - OBJECTIVE STATEMENT
33M with no pmh presenting with cough, congestion, fever x 2 days. Pt states he had posttussive vomiting yesterday. Took tylenol last night. 33M with no pmh presenting with cough, congestion, fever x 2 days. Pt states he had posttussive vomiting yesterday. chest pain onl;y when coughing. Took tylenol last night. denies sick contacts.   Denies sob, abdominal pain, diarrhea, dysuria, weakness, vision changes.  No flu vaccine this year.

## 2023-12-26 NOTE — ED PROVIDER NOTE - PATIENT PORTAL LINK FT
You can access the FollowMyHealth Patient Portal offered by [facility] by registering at the following website: [email or web address]. By joining [employer]’s FollowMyHealth portal, you will also be able to view your health information using other applications (apps) compatible with our system. You can access the FollowMyHealth Patient Portal offered by [facility] by registering at the following website: [email or web address]. By joining [employer]’s FollowMyHealth portal, you will also be able to view your health information using other applications (apps) compatible with our system.

## 2023-12-26 NOTE — ED PROVIDER NOTE - PHYSICAL EXAMINATION
Gen: No acute distress, non toxic  HEENT: NCAT. Mucous membranes moist, uvula m/l, pink conjunctivae, EOMI  CV: RRR, nl s1/s2. mild TTP anterior chest.   Resp: CTAB, normal rate and effort  GI: Abdomen soft, NT, ND. No rebound, no guarding  Neuro: A&O x 3, moving all 4 extremities.   MSK: No spine or joint tenderness to palpation  Skin: No rashes. intact and perfused.

## 2024-05-13 NOTE — ED ADULT NURSE NOTE - PRO INTERPRETER NEED 2
The patient's goals for the shift include remain safe    The clinical goals for the shift include patient to remain safe and free of injury    Over the shift, the patient did not make progress toward the following goals. Barriers to progression include acute illness. Recommendations to address these barriers include communication.     English

## 2024-05-17 NOTE — DISCHARGE NOTE PROVIDER - NSDCDCMDCOMP_GEN_ALL_CORE
[Normal] : normal S1, S2, no murmur, no rub, no gallop
This document is complete and the patient is ready for discharge.

## 2024-06-07 ENCOUNTER — EMERGENCY (EMERGENCY)
Facility: HOSPITAL | Age: 34
LOS: 1 days | Discharge: HOME | End: 2024-06-07
Attending: EMERGENCY MEDICINE
Payer: MEDICAID

## 2024-06-07 VITALS
HEART RATE: 86 BPM | DIASTOLIC BLOOD PRESSURE: 76 MMHG | RESPIRATION RATE: 20 BRPM | SYSTOLIC BLOOD PRESSURE: 158 MMHG | TEMPERATURE: 98 F | OXYGEN SATURATION: 98 % | WEIGHT: 250 LBS

## 2024-06-07 DIAGNOSIS — Z90.49 ACQUIRED ABSENCE OF OTHER SPECIFIED PARTS OF DIGESTIVE TRACT: Chronic | ICD-10-CM

## 2024-06-07 PROCEDURE — 99283 EMERGENCY DEPT VISIT LOW MDM: CPT

## 2024-06-07 PROCEDURE — 99284 EMERGENCY DEPT VISIT MOD MDM: CPT

## 2024-06-07 RX ORDER — ACETAMINOPHEN 500 MG
975 TABLET ORAL ONCE
Refills: 0 | Status: COMPLETED | OUTPATIENT
Start: 2024-06-07 | End: 2024-06-07

## 2024-06-07 RX ORDER — IBUPROFEN 200 MG
1 TABLET ORAL
Qty: 28 | Refills: 0
Start: 2024-06-07 | End: 2024-06-13

## 2024-06-07 RX ORDER — IBUPROFEN 200 MG
600 TABLET ORAL ONCE
Refills: 0 | Status: COMPLETED | OUTPATIENT
Start: 2024-06-07 | End: 2024-06-07

## 2024-06-07 RX ORDER — METHOCARBAMOL 500 MG/1
2 TABLET, FILM COATED ORAL
Qty: 42 | Refills: 0
Start: 2024-06-07 | End: 2024-06-13

## 2024-06-07 RX ADMIN — Medication 975 MILLIGRAM(S): at 18:39

## 2024-06-07 RX ADMIN — Medication 600 MILLIGRAM(S): at 18:39

## 2024-06-07 NOTE — ED PROVIDER NOTE - CARE PROVIDER_API CALL
Zack Nicolas  Orthopaedic Surgery  [address] [place] [id number]  Phone: [phone]  Fax: [phone]  Follow Up Time: 7-10 Days

## 2024-06-07 NOTE — ED PROVIDER NOTE - ATTENDING APP SHARED VISIT CONTRIBUTION OF CARE
33-year-old male presents with left groin tenderness and inner thigh pain after he fell on a cement step and strained his legs yesterday.  Patient  ambulatory at home in ED mild pain in the groin area.  No hernia.  Symptom likely muscle strain.  Tylenol and Motrin given.   DC home on ibuprofen and Robaxin.

## 2024-06-07 NOTE — ED PROVIDER NOTE - PATIENT PORTAL LINK FT
You can access the FollowMyHealth Patient Portal offered by [facility] by registering at the following website: [email or web address]. By joining [employer]’s FollowMyHealth portal, you will also be able to view your health information using other applications (apps) compatible with our system.

## 2024-06-07 NOTE — ED PROVIDER NOTE - OBJECTIVE STATEMENT
32 yo M no PMH presents for L groin and inner thigh pain. States he had a misstep and fell onto his cement steps, hurting his L groin yesterday. Only applied ice to the site but having continued pain. Concerned about a hernia and so he presented to ED. No difficulty ambulating. Denies headstrike, LOC. No hx hernia. No fever, sweats, chills. No headache, vision changes, chest pain, SOB, nausea/vomiting, abdominal pain, dysuria, hematuria, bloody stools, diarrhea.

## 2024-06-07 NOTE — ED ADULT NURSE NOTE - OBJECTIVE STATEMENT
34 YO male presented to the ED with C/O groin pain , pulling pain since yesterday. S/P fall yesterday, denies head injury, not on any anticoagulation medications.  Patient stated he feels pain in the groin area on walking and lifting heavy objects " concerned that it may be hernia". Md Springer at bedside, no swelling noted in the inguinal area, no redness. sensations intact. patient able to bear weight. Denies tingling numbness down the legs or any urinary symptoms. Pain medications given as per orders. Ice pack provided for comfort.

## 2024-06-07 NOTE — ED ADULT NURSE NOTE - NSFALLRISKINTERV_ED_ALL_ED

## 2024-06-07 NOTE — ED PROVIDER NOTE - CLINICAL SUMMARY MEDICAL DECISION MAKING FREE TEXT BOX
32 yo M no PMH presents for L groin/inner thigh pain s/p fall onto groin on cement step yesterday. No hx hernia. In ED, VSS. No palpable hernia, no swelling or hematoma. No wound/abrasion to groin/inner thigh. No TTP. Normal gait.    Pain control and ice pack to L groin. Likely hamstring strain. Will discharge home with return precautions. Discussed with patient that if pain does not improve in 2 weeks, or worsening pain despite ice and pain medication, can follow up outpatient with orthopedist.

## 2024-06-07 NOTE — ED PROVIDER NOTE - PHYSICAL EXAMINATION
Gen: NAD, AOx3  Head: NCAT  HEENT: EOMI, oral mucosa moist, normal conjunctiva, neck supple  Lung: CTAB, no respiratory distress  CV: rrr, no murmur, Normal perfusion  Abd: soft, NT, ND. No guarding, no rigidity  : no palpable hernia; no wound/abrasion/laceration to L groin/inner thigh; no edema, erythema or TTP around L groin/inner thigh  MSK: No edema, no visible deformities. motor/sensory intact all extremities  Neuro: No focal neurologic deficits. Normal gait  Skin: No rash   Psych: normal affect

## 2024-06-07 NOTE — ED ADULT TRIAGE NOTE - CHIEF COMPLAINT QUOTE
Patient presents to ER C/O pelvis/groin pain S/P fall yesterday, denies head injury, no blood thinners.

## 2024-06-07 NOTE — ED PROVIDER NOTE - NSFOLLOWUPINSTRUCTIONS_ED_ALL_ED_FT
1. Return to ED for worsening, progressive or any other concerning symptoms   2. Follow up with your primary care doctor in 2-3days  3. May take tylenol and ibuprofen for pain. Take tylenol 650mg every 6 hours, do not exceed 4000 mg in 24 hours. Take ibuprofen 400mg every 6 hours, do not exceed 3200 mg in 24 hours. May alternate between tylenol and ibuprofen every 3 hours.  4. Robaxin (muscle relaxer) has been sent to your pharmacy. Take 750mg every 8 hours as needed for pain.     Contusion    A contusion is a deep bruise. Contusions are the result of a blunt injury to tissues and muscle fibers under the skin. The skin overlying the contusion may turn blue, purple, or yellow. Symptoms also include pain and swelling in the injured area.    SEEK IMMEDIATE MEDICAL CARE IF YOU HAVE ANY OF THE FOLLOWING SYMPTOMS: severe pain, numbness, tingling, pain, weakness, or skin color/temperature change in any part of your body distal to the injury.

## 2024-07-14 ENCOUNTER — EMERGENCY (EMERGENCY)
Facility: HOSPITAL | Age: 34
LOS: 1 days | Discharge: HOME | End: 2024-07-14
Attending: EMERGENCY MEDICINE
Payer: MEDICAID

## 2024-07-14 VITALS
WEIGHT: 285.28 LBS | OXYGEN SATURATION: 95 % | HEIGHT: 71 IN | HEART RATE: 93 BPM | RESPIRATION RATE: 18 BRPM | DIASTOLIC BLOOD PRESSURE: 89 MMHG | TEMPERATURE: 98 F | SYSTOLIC BLOOD PRESSURE: 136 MMHG

## 2024-07-14 VITALS
OXYGEN SATURATION: 98 % | SYSTOLIC BLOOD PRESSURE: 132 MMHG | TEMPERATURE: 98 F | HEART RATE: 82 BPM | RESPIRATION RATE: 18 BRPM | DIASTOLIC BLOOD PRESSURE: 82 MMHG

## 2024-07-14 DIAGNOSIS — Z90.49 ACQUIRED ABSENCE OF OTHER SPECIFIED PARTS OF DIGESTIVE TRACT: Chronic | ICD-10-CM

## 2024-07-14 PROCEDURE — 96372 THER/PROPH/DIAG INJ SC/IM: CPT

## 2024-07-14 PROCEDURE — 99284 EMERGENCY DEPT VISIT MOD MDM: CPT

## 2024-07-14 PROCEDURE — 73590 X-RAY EXAM OF LOWER LEG: CPT | Mod: 26,LT

## 2024-07-14 PROCEDURE — 93971 EXTREMITY STUDY: CPT

## 2024-07-14 PROCEDURE — 93971 EXTREMITY STUDY: CPT | Mod: 26,LT

## 2024-07-14 PROCEDURE — 73590 X-RAY EXAM OF LOWER LEG: CPT

## 2024-07-14 RX ORDER — METHOCARBAMOL 500 MG/1
1500 TABLET, FILM COATED ORAL ONCE
Refills: 0 | Status: COMPLETED | OUTPATIENT
Start: 2024-07-14 | End: 2024-07-14

## 2024-07-14 RX ORDER — METHOCARBAMOL 500 MG/1
2 TABLET, FILM COATED ORAL
Qty: 20 | Refills: 0
Start: 2024-07-14 | End: 2024-07-18

## 2024-07-14 RX ORDER — IBUPROFEN 200 MG
1 TABLET ORAL
Qty: 18 | Refills: 0
Start: 2024-07-14 | End: 2024-07-19

## 2024-07-14 RX ORDER — KETOROLAC TROMETHAMINE 30 MG/ML
30 INJECTION, SOLUTION INTRAMUSCULAR; INTRAVENOUS ONCE
Refills: 0 | Status: DISCONTINUED | OUTPATIENT
Start: 2024-07-14 | End: 2024-07-14

## 2024-07-14 RX ADMIN — KETOROLAC TROMETHAMINE 30 MILLIGRAM(S): 30 INJECTION, SOLUTION INTRAMUSCULAR; INTRAVENOUS at 14:20

## 2024-07-14 RX ADMIN — METHOCARBAMOL 1500 MILLIGRAM(S): 500 TABLET, FILM COATED ORAL at 13:49

## 2024-07-14 RX ADMIN — KETOROLAC TROMETHAMINE 30 MILLIGRAM(S): 30 INJECTION, SOLUTION INTRAMUSCULAR; INTRAVENOUS at 13:49

## 2025-02-06 ENCOUNTER — EMERGENCY (EMERGENCY)
Facility: HOSPITAL | Age: 35
LOS: 1 days | Discharge: HOME | End: 2025-02-06
Attending: STUDENT IN AN ORGANIZED HEALTH CARE EDUCATION/TRAINING PROGRAM
Payer: SELF-PAY

## 2025-02-06 VITALS
TEMPERATURE: 98 F | SYSTOLIC BLOOD PRESSURE: 140 MMHG | DIASTOLIC BLOOD PRESSURE: 85 MMHG | RESPIRATION RATE: 20 BRPM | OXYGEN SATURATION: 96 % | WEIGHT: 311.95 LBS | HEART RATE: 89 BPM

## 2025-02-06 DIAGNOSIS — Z90.49 ACQUIRED ABSENCE OF OTHER SPECIFIED PARTS OF DIGESTIVE TRACT: Chronic | ICD-10-CM

## 2025-02-06 LAB
FLUAV AG NPH QL: SIGNIFICANT CHANGE UP
FLUBV AG NPH QL: SIGNIFICANT CHANGE UP
HCT VFR BLD CALC: 45 % — SIGNIFICANT CHANGE UP (ref 39–50)
HGB BLD-MCNC: 16.1 G/DL — SIGNIFICANT CHANGE UP (ref 13–17)
LIDOCAIN IGE QN: 17 U/L — LOW (ref 22–51)
MCHC RBC-ENTMCNC: 30.1 PG — SIGNIFICANT CHANGE UP (ref 27–34)
MCHC RBC-ENTMCNC: 35.8 G/DL — SIGNIFICANT CHANGE UP (ref 32–36)
MCV RBC AUTO: 84.1 FL — SIGNIFICANT CHANGE UP (ref 80–100)
PLATELET # BLD AUTO: 166 K/UL — SIGNIFICANT CHANGE UP (ref 150–400)
RBC # BLD: 5.35 M/UL — SIGNIFICANT CHANGE UP (ref 4.2–5.8)
RBC # FLD: 12.7 % — SIGNIFICANT CHANGE UP (ref 10.3–14.5)
RSV RNA NPH QL NAA+NON-PROBE: SIGNIFICANT CHANGE UP
SARS-COV-2 RNA SPEC QL NAA+PROBE: SIGNIFICANT CHANGE UP
WBC # BLD: 8.34 K/UL — SIGNIFICANT CHANGE UP (ref 3.8–10.5)
WBC # FLD AUTO: 8.34 K/UL — SIGNIFICANT CHANGE UP (ref 3.8–10.5)

## 2025-02-06 PROCEDURE — 82550 ASSAY OF CK (CPK): CPT

## 2025-02-06 PROCEDURE — 87637 SARSCOV2&INF A&B&RSV AMP PRB: CPT

## 2025-02-06 PROCEDURE — 99283 EMERGENCY DEPT VISIT LOW MDM: CPT | Mod: 25

## 2025-02-06 PROCEDURE — 82553 CREATINE MB FRACTION: CPT

## 2025-02-06 PROCEDURE — 83690 ASSAY OF LIPASE: CPT

## 2025-02-06 PROCEDURE — 96372 THER/PROPH/DIAG INJ SC/IM: CPT

## 2025-02-06 PROCEDURE — 85027 COMPLETE CBC AUTOMATED: CPT

## 2025-02-06 PROCEDURE — 99053 MED SERV 10PM-8AM 24 HR FAC: CPT

## 2025-02-06 PROCEDURE — 99284 EMERGENCY DEPT VISIT MOD MDM: CPT

## 2025-02-06 PROCEDURE — 80053 COMPREHEN METABOLIC PANEL: CPT

## 2025-02-06 PROCEDURE — 36415 COLL VENOUS BLD VENIPUNCTURE: CPT

## 2025-02-06 RX ORDER — ACETAMINOPHEN 160 MG/5ML
650 SUSPENSION ORAL ONCE
Refills: 0 | Status: COMPLETED | OUTPATIENT
Start: 2025-02-06 | End: 2025-02-06

## 2025-02-06 RX ORDER — ONDANSETRON 4 MG/1
4 TABLET, ORALLY DISINTEGRATING ORAL ONCE
Refills: 0 | Status: COMPLETED | OUTPATIENT
Start: 2025-02-06 | End: 2025-02-06

## 2025-02-06 RX ORDER — ONDANSETRON 4 MG/1
4 TABLET, ORALLY DISINTEGRATING ORAL ONCE
Refills: 0 | Status: DISCONTINUED | OUTPATIENT
Start: 2025-02-06 | End: 2025-02-06

## 2025-02-06 RX ORDER — BACTERIOSTATIC SODIUM CHLORIDE 0.9 %
1000 VIAL (ML) INJECTION ONCE
Refills: 0 | Status: COMPLETED | OUTPATIENT
Start: 2025-02-06 | End: 2025-02-06

## 2025-02-06 RX ORDER — KETOROLAC TROMETHAMINE 10 MG
30 TABLET ORAL ONCE
Refills: 0 | Status: DISCONTINUED | OUTPATIENT
Start: 2025-02-06 | End: 2025-02-06

## 2025-02-06 RX ORDER — ONDANSETRON 4 MG/1
1 TABLET, ORALLY DISINTEGRATING ORAL
Qty: 1 | Refills: 0
Start: 2025-02-06

## 2025-02-06 RX ADMIN — ACETAMINOPHEN 650 MILLIGRAM(S): 160 SUSPENSION ORAL at 05:37

## 2025-02-06 RX ADMIN — Medication 1000 MILLILITER(S): at 06:30

## 2025-02-06 RX ADMIN — Medication 30 MILLIGRAM(S): at 05:37

## 2025-02-06 RX ADMIN — ONDANSETRON 4 MILLIGRAM(S): 4 TABLET, ORALLY DISINTEGRATING ORAL at 05:37
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
## 2025-02-06 NOTE — ED PROVIDER NOTE - PROGRESS NOTE DETAILS
Díaz: Pt feeling improved, tolerating PO. Labs showing mildly elevated CPK, otherwise with acute findings. Medically stable for discharge.
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
## 2025-02-06 NOTE — ED PROVIDER NOTE - PHYSICAL EXAMINATION
Gen: No acute distress, non toxic  HEENT: Mucous membranes moist, pink conjunctivae, EOMI. PERRL. Airway patent.   CV: RRR, nl s1/s2.  Resp: CTAB, normal rate and effort. No wheezes, rhonchi, or crackles.   GI: Abdomen soft, NT, ND. No rebound, no guarding  Neuro: A&O x4, MAEx4. 5/5 str ext x 4. Sensation intact, symmetric throughout. No FND's. CN 2-12 intact.   MSK: No midline spinal ttp. No visualized or palpable deformities.  Skin: No rashes, skin intact and well perfused. Cap refill <2sec  Vascular: Radial and dorsalis pedal pulses 2+ b/l
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
## 2025-02-06 NOTE — ED PROVIDER NOTE - PATIENT PORTAL LINK FT
You can access the FollowMyHealth Patient Portal offered by [facility] by registering at the following website: [email or web address]. By joining [employer]’s FollowMyHealth portal, you will also be able to view your health information using other applications (apps) compatible with our system.
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
## 2025-03-18 NOTE — ED ADULT NURSE NOTE - CHPI ED NUR SEVERITY2
PAIN SCALE 6 OF 10. CONSTITUTIONAL: In no apparent distress.  ENT: Airway patent, moist mucous membranes.   EYES: Pupils equal, round and reactive to light. EOMI. Conjunctiva normal appearing.   CARDIAC: Normal rate, regular rhythm.  Heart sounds S1, S2.    RESPIRATORY: Breath sounds clear and equal bilaterally.   GASTROINTESTINAL: Abdomen soft, non-tender, not distended.  MUSCULOSKELETAL: +Minimal left hand swelling, +TTP over left 5th finger. Can flex and extend however does report some pain with flexion. No arm swelling. Radial pulse and sensation intact.   NEUROLOGICAL: Alert and oriented x3, no focal deficits.

## 2025-05-25 NOTE — ED PROVIDER NOTE - CCCP TRG CHIEF CMPLNT
OVERNIGHT EVENTS: none    SUBJECTIVE / INTERVAL HPI: Patient seen and examined at bedside. Reports overall feels improved from yesterday. Able to get out of bed on his own. Inquiring why his tube feeds weren't restarted yesterday as planned.     VITAL SIGNS:  Vital Signs Last 24 Hrs  T(C): 36.3 (25 May 2025 10:32), Max: 36.7 (24 May 2025 20:35)  T(F): 97.3 (25 May 2025 10:32), Max: 98.1 (24 May 2025 20:35)  HR: 64 (25 May 2025 10:32) (52 - 64)  BP: 106/72 (25 May 2025 10:32) (104/64 - 111/74)  BP(mean): --  RR: 18 (25 May 2025 10:32) (17 - 18)  SpO2: 99% (25 May 2025 10:32) (95% - 99%)    Parameters below as of 25 May 2025 10:32  Patient On (Oxygen Delivery Method): room air        05-24-25 @ 07:01  -  05-25-25 @ 07:00  --------------------------------------------------------  IN: 0 mL / OUT: 250 mL / NET: -250 mL        PHYSICAL EXAM:    General: Thin  male NAD  HEENT: NC/AT  Neck: supple  Cardiovascular: +S1/S2; RRR  Respiratory: CTA B/L; no W/R/R  Gastrointestinal: soft, NT/ND; +J tube in place  Extremities: WWP; 3+ pitting LE b/l, right dorsal foot into ankle slightly warm and erythematous   Neurological: AAOx3; speech perhaps slightly slow but clear without aphasia, no focal deficits noted     MEDICATIONS:  MEDICATIONS  (STANDING):  apixaban 5 milliGRAM(s) Oral every 12 hours  ceFAZolin   IVPB 1000 milliGRAM(s) IV Intermittent every 8 hours  furosemide   Injectable 40 milliGRAM(s) IV Push once  lactulose Syrup 10 Gram(s) Oral every 8 hours  levothyroxine 100 MICROGram(s) Oral daily  multivitamin 1 Tablet(s) Oral daily  senna 2 Tablet(s) Oral at bedtime  sodium chloride 0.9% lock flush 200 milliLiter(s) IV Push every 6 hours  spironolactone 25 milliGRAM(s) Oral every 24 hours  thiamine 100 milliGRAM(s) Oral every 24 hours    MEDICATIONS  (PRN):  polyethylene glycol 3350 17 Gram(s) Oral daily PRN Constipation  zolpidem 5 milliGRAM(s) Oral at bedtime PRN Insomnia      ALLERGIES:  Allergies    No Known Allergies    Intolerances        LABS:                        11.8   3.76  )-----------( 167      ( 25 May 2025 10:50 )             34.7     05-25    138  |  105  |  14  ----------------------------<  81  3.8   |  21[L]  |  0.87    Ca    7.9[L]      25 May 2025 06:51  Phos  2.7     05-25  Mg     2.0     05-25    TPro  6.5  /  Alb  2.5[L]  /  TBili  0.8  /  DBili  x   /  AST  33  /  ALT  12  /  AlkPhos  91  05-25    PT/INR - ( 25 May 2025 06:51 )   PT: 15.7 sec;   INR: 1.35          PTT - ( 25 May 2025 06:51 )  PTT:31.5 sec  Urinalysis Basic - ( 25 May 2025 06:51 )    Color: x / Appearance: x / SG: x / pH: x  Gluc: 81 mg/dL / Ketone: x  / Bili: x / Urobili: x   Blood: x / Protein: x / Nitrite: x   Leuk Esterase: x / RBC: x / WBC x   Sq Epi: x / Non Sq Epi: x / Bacteria: x      CAPILLARY BLOOD GLUCOSE      POCT Blood Glucose.: 81 mg/dL (25 May 2025 08:16)        RADIOLOGY & ADDITIONAL TESTS: Reviewed.    ASSESSMENT:    PLAN:  abscess

## 2025-07-09 NOTE — ED ADULT TRIAGE NOTE - IDEAL BODY WEIGHT(KG)
In an effort to ensure that our patients [employer], a Team Member has reviewed your chart and identified an opportunity to provide the best care possible. An attempt was made to discuss or schedule due or overdue Preventive or Chronic Condition care.Care Gaps identified: Colorectal Cancer Screening.    The Outcome was Contact was not made, left message. We are attempting to schedule a lab appointment. If you have any questions or need help with scheduling, contact your primary care provider..      If patient returns my call please inform about the importance of having the colorectal cancer screening done.   78

## (undated) DEVICE — BLADE SURGICAL #15 CARBON

## (undated) DEVICE — WRAP COMPRESSION CALF MED

## (undated) DEVICE — D HELP - CLEARVIEW CLEARIFY SYSTEM

## (undated) DEVICE — ENDOCATCH 10MM SPECIMEN POUCH

## (undated) DEVICE — DRSG 2X2

## (undated) DEVICE — DRSG STERISTRIPS 0.5X4"

## (undated) DEVICE — DISSECTOR ENDOSCOPIC KITTNER SINGLE TIP

## (undated) DEVICE — Device

## (undated) DEVICE — LIGASURE MARYLAND JAW LAPAROSCOPIC SEALER 37CM

## (undated) DEVICE — BLADE SURGICAL #11 CARBON

## (undated) DEVICE — TROCAR ETHICON XCEL UNIVERSAL SLEEVE W OPTIVIEW 5MM-100MM

## (undated) DEVICE — TROCAR COVIDIEN VERSAONE OPTICAL BLADELESS 5MM

## (undated) DEVICE — TROCAR COVIDIEN VERSAONE BLUNT TIP HASSAN 12MM

## (undated) DEVICE — SUT VICRYL PLUS 4-0 18" PS-2 UNDYED

## (undated) DEVICE — PACK GENERAL LAPAROSCOPY

## (undated) DEVICE — GLV 8 ESTEEM BLUE

## (undated) DEVICE — DRAPE HALF SHEET 40X57"

## (undated) DEVICE — SYR CONTROL LUER LOK 10CC

## (undated) DEVICE — DRSG MASTISOL

## (undated) DEVICE — BLANKET WARMER UPPER ADULT

## (undated) DEVICE — GLV 8 PROTEXIS

## (undated) DEVICE — ELCTR CORD FOOTSWITCH 1PLR LAPSCP 10FT

## (undated) DEVICE — DRSG TEGADERM 2.5X3"

## (undated) DEVICE — BLANKET WARMER LOWER ADULT

## (undated) DEVICE — SUT VICRYL 0 27" UR-6